# Patient Record
Sex: FEMALE | Race: WHITE | NOT HISPANIC OR LATINO
[De-identification: names, ages, dates, MRNs, and addresses within clinical notes are randomized per-mention and may not be internally consistent; named-entity substitution may affect disease eponyms.]

---

## 2018-01-25 PROBLEM — Z00.00 ENCOUNTER FOR PREVENTIVE HEALTH EXAMINATION: Status: ACTIVE | Noted: 2018-01-25

## 2018-01-26 ENCOUNTER — APPOINTMENT (OUTPATIENT)
Dept: HEART AND VASCULAR | Facility: CLINIC | Age: 68
End: 2018-01-26
Payer: MEDICARE

## 2018-01-26 VITALS
BODY MASS INDEX: 28.71 KG/M2 | TEMPERATURE: 98.7 F | SYSTOLIC BLOOD PRESSURE: 126 MMHG | HEART RATE: 70 BPM | HEIGHT: 62 IN | OXYGEN SATURATION: 98 % | WEIGHT: 156.01 LBS | DIASTOLIC BLOOD PRESSURE: 80 MMHG | RESPIRATION RATE: 16 BRPM

## 2018-01-26 DIAGNOSIS — I35.0 NONRHEUMATIC AORTIC (VALVE) STENOSIS: ICD-10-CM

## 2018-01-26 DIAGNOSIS — Z01.810 ENCOUNTER FOR PREPROCEDURAL CARDIOVASCULAR EXAMINATION: ICD-10-CM

## 2018-01-26 DIAGNOSIS — Z78.9 OTHER SPECIFIED HEALTH STATUS: ICD-10-CM

## 2018-01-26 DIAGNOSIS — Z87.891 PERSONAL HISTORY OF NICOTINE DEPENDENCE: ICD-10-CM

## 2018-01-26 DIAGNOSIS — Z80.1 FAMILY HISTORY OF MALIGNANT NEOPLASM OF TRACHEA, BRONCHUS AND LUNG: ICD-10-CM

## 2018-01-26 DIAGNOSIS — R94.31 ABNORMAL ELECTROCARDIOGRAM [ECG] [EKG]: ICD-10-CM

## 2018-01-26 DIAGNOSIS — I34.0 NONRHEUMATIC MITRAL (VALVE) INSUFFICIENCY: ICD-10-CM

## 2018-01-26 DIAGNOSIS — Z82.49 FAMILY HISTORY OF ISCHEMIC HEART DISEASE AND OTHER DISEASES OF THE CIRCULATORY SYSTEM: ICD-10-CM

## 2018-01-26 DIAGNOSIS — K63.5 POLYP OF COLON: ICD-10-CM

## 2018-01-26 PROCEDURE — 93351 STRESS TTE COMPLETE: CPT

## 2018-01-26 PROCEDURE — 93325 DOPPLER ECHO COLOR FLOW MAPG: CPT

## 2018-01-26 PROCEDURE — 99214 OFFICE O/P EST MOD 30 MIN: CPT | Mod: 25

## 2018-01-26 PROCEDURE — 93320 DOPPLER ECHO COMPLETE: CPT

## 2018-01-31 ENCOUNTER — RESULT REVIEW (OUTPATIENT)
Age: 68
End: 2018-01-31

## 2018-01-31 ENCOUNTER — OUTPATIENT (OUTPATIENT)
Dept: OUTPATIENT SERVICES | Facility: HOSPITAL | Age: 68
LOS: 1 days | Discharge: HOME | End: 2018-01-31
Payer: MEDICARE

## 2018-01-31 PROCEDURE — 88305 TISSUE EXAM BY PATHOLOGIST: CPT

## 2018-01-31 PROCEDURE — 45385 COLONOSCOPY W/LESION REMOVAL: CPT

## 2018-02-01 LAB — SURGICAL PATHOLOGY STUDY: SIGNIFICANT CHANGE UP

## 2018-05-24 ENCOUNTER — APPOINTMENT (OUTPATIENT)
Dept: NEUROSURGERY | Facility: CLINIC | Age: 68
End: 2018-05-24
Payer: MEDICARE

## 2018-05-24 VITALS
TEMPERATURE: 98.2 F | WEIGHT: 158 LBS | OXYGEN SATURATION: 97 % | DIASTOLIC BLOOD PRESSURE: 84 MMHG | HEART RATE: 79 BPM | HEIGHT: 62 IN | SYSTOLIC BLOOD PRESSURE: 150 MMHG | RESPIRATION RATE: 18 BRPM | BODY MASS INDEX: 29.08 KG/M2

## 2018-05-24 DIAGNOSIS — Z86.69 PERSONAL HISTORY OF OTHER DISEASES OF THE NERVOUS SYSTEM AND SENSE ORGANS: ICD-10-CM

## 2018-05-24 DIAGNOSIS — G89.29 LOW BACK PAIN: ICD-10-CM

## 2018-05-24 DIAGNOSIS — Z87.39 PERSONAL HISTORY OF OTHER DISEASES OF THE MUSCULOSKELETAL SYSTEM AND CONNECTIVE TISSUE: ICD-10-CM

## 2018-05-24 DIAGNOSIS — G54.8 OTHER NERVE ROOT AND PLEXUS DISORDERS: ICD-10-CM

## 2018-05-24 DIAGNOSIS — M54.5 LOW BACK PAIN: ICD-10-CM

## 2018-05-24 PROCEDURE — 99204 OFFICE O/P NEW MOD 45 MIN: CPT

## 2018-05-29 PROBLEM — Z86.69 HISTORY OF SLEEP APNEA: Status: RESOLVED | Noted: 2018-05-24 | Resolved: 2018-05-29

## 2018-05-29 PROBLEM — Z87.39 HISTORY OF ARTHRITIS: Status: RESOLVED | Noted: 2018-05-24 | Resolved: 2018-05-29

## 2018-07-23 PROBLEM — G54.8 PERINEURAL CYST: Status: ACTIVE | Noted: 2018-05-29

## 2018-08-21 ENCOUNTER — APPOINTMENT (OUTPATIENT)
Dept: ORTHOPEDIC SURGERY | Facility: CLINIC | Age: 68
End: 2018-08-21
Payer: COMMERCIAL

## 2018-08-21 VITALS — BODY MASS INDEX: 27.64 KG/M2 | WEIGHT: 156 LBS | HEIGHT: 63 IN

## 2018-08-21 PROCEDURE — 99204 OFFICE O/P NEW MOD 45 MIN: CPT

## 2018-08-21 RX ORDER — PREDNISONE 10 MG/1
10 TABLET ORAL
Qty: 30 | Refills: 0 | Status: DISCONTINUED | COMMUNITY
Start: 2018-04-10 | End: 2018-08-21

## 2018-08-21 RX ORDER — AMOXICILLIN 500 MG/1
500 CAPSULE ORAL
Qty: 20 | Refills: 0 | Status: DISCONTINUED | COMMUNITY
Start: 2018-05-29 | End: 2018-08-21

## 2018-08-21 RX ORDER — AMOXICILLIN AND CLAVULANATE POTASSIUM 875; 125 MG/1; MG/1
875-125 TABLET, COATED ORAL
Qty: 14 | Refills: 0 | Status: DISCONTINUED | COMMUNITY
Start: 2018-04-04 | End: 2018-08-21

## 2018-08-21 RX ORDER — OXYCODONE AND ACETAMINOPHEN 5; 325 MG/1; MG/1
5-325 TABLET ORAL
Qty: 8 | Refills: 0 | Status: DISCONTINUED | COMMUNITY
Start: 2018-05-29 | End: 2018-08-21

## 2018-10-09 ENCOUNTER — APPOINTMENT (OUTPATIENT)
Dept: ORTHOPEDIC SURGERY | Facility: CLINIC | Age: 68
End: 2018-10-09

## 2019-01-07 PROBLEM — Z00.00 ENCOUNTER FOR PREVENTIVE HEALTH EXAMINATION: Noted: 2019-01-07

## 2019-01-18 ENCOUNTER — APPOINTMENT (OUTPATIENT)
Dept: ORTHOPEDIC SURGERY | Facility: CLINIC | Age: 69
End: 2019-01-18
Payer: COMMERCIAL

## 2019-01-18 VITALS — HEIGHT: 63 IN | BODY MASS INDEX: 27.64 KG/M2 | WEIGHT: 156 LBS

## 2019-01-18 PROCEDURE — 99213 OFFICE O/P EST LOW 20 MIN: CPT
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
## 2019-01-20 NOTE — HISTORY OF PRESENT ILLNESS
[de-identified] : Ms. Mackey returns today stating that she continues to have right shoulder pain it compromises sleep and activities of daily living. She has performed her home exercises and has increased range of motion of the right shoulder but she continues to have disabling pain.

## 2019-01-20 NOTE — PHYSICAL EXAM
[de-identified] : The right shoulder has 165° passive forward elevation, 60° external rotation, and internal rotation to the 10th thoracic vertebra. She has good strength of external rotation and can fully actively raise the arm but this is painful.

## 2019-01-20 NOTE — CONSULT LETTER
[Dear  ___] : Dear  [unfilled], [FreeTextEntry1] : Today I had the pleasure of evaluating your very nice patient PHAM NARANJO  who requested that I share my findings with you. I very much appreciate the referral. \par \par Please review my office note below and, needless to say, please call or email me with any questions or concerns.\par \par I appreciate the opportunity to participate in her care.\par \par Sincerely,\par \par Maximilian Michael MD\par Director, Orthopaedic Surgery\par and Orthopaedic Strategic Initiatives \par [facility]\par Office: [phone]\par Cell: [phone]\par Email: [email or web address] Website: [employer] \par \par \par \par \par

## 2019-04-16 DIAGNOSIS — M75.41 IMPINGEMENT SYNDROME OF RIGHT SHOULDER: ICD-10-CM

## 2019-04-16 DIAGNOSIS — M75.01 ADHESIVE CAPSULITIS OF RIGHT SHOULDER: ICD-10-CM

## 2019-08-26 ENCOUNTER — APPOINTMENT (OUTPATIENT)
Dept: HEART AND VASCULAR | Facility: CLINIC | Age: 69
End: 2019-08-26
Payer: MEDICARE

## 2019-08-26 VITALS
OXYGEN SATURATION: 97 % | TEMPERATURE: 98.4 F | SYSTOLIC BLOOD PRESSURE: 98 MMHG | WEIGHT: 161 LBS | HEIGHT: 62.99 IN | BODY MASS INDEX: 28.53 KG/M2 | DIASTOLIC BLOOD PRESSURE: 50 MMHG | HEART RATE: 90 BPM

## 2019-08-26 DIAGNOSIS — R06.02 SHORTNESS OF BREATH: ICD-10-CM

## 2019-08-26 DIAGNOSIS — R01.1 CARDIAC MURMUR, UNSPECIFIED: ICD-10-CM

## 2019-08-26 PROCEDURE — 93000 ELECTROCARDIOGRAM COMPLETE: CPT

## 2019-08-26 PROCEDURE — 99204 OFFICE O/P NEW MOD 45 MIN: CPT

## 2019-08-26 RX ORDER — OMEPRAZOLE 20 MG/1
20 CAPSULE, DELAYED RELEASE ORAL DAILY
Refills: 0 | Status: ACTIVE | COMMUNITY

## 2019-08-26 NOTE — PHYSICAL EXAM
[Normal Appearance] : normal appearance [General Appearance - Well Developed] : well developed [Well Groomed] : well groomed [General Appearance - Well Nourished] : well nourished [No Deformities] : no deformities [Normal Conjunctiva] : the conjunctiva exhibited no abnormalities [General Appearance - In No Acute Distress] : no acute distress [Eyelids - No Xanthelasma] : the eyelids demonstrated no xanthelasmas [Normal Oral Mucosa] : normal oral mucosa [No Oral Pallor] : no oral pallor [No Oral Cyanosis] : no oral cyanosis [Normal Jugular Venous A Waves Present] : normal jugular venous A waves present [Normal Jugular Venous V Waves Present] : normal jugular venous V waves present [No Jugular Venous Kim A Waves] : no jugular venous kim A waves [Heart Rate And Rhythm] : heart rate and rhythm were normal [Heart Sounds] : normal S1 and S2 [FreeTextEntry1] : 3/6 ha to axilla [Exaggerated Use Of Accessory Muscles For Inspiration] : no accessory muscle use [Respiration, Rhythm And Depth] : normal respiratory rhythm and effort [Auscultation Breath Sounds / Voice Sounds] : lungs were clear to auscultation bilaterally [Abdomen Soft] : soft [Abdomen Tenderness] : non-tender [Abdomen Mass (___ Cm)] : no abdominal mass palpated [Abnormal Walk] : normal gait [Gait - Sufficient For Exercise Testing] : the gait was sufficient for exercise testing [Cyanosis, Localized] : no localized cyanosis [Nail Clubbing] : no clubbing of the fingernails [Petechial Hemorrhages (___cm)] : no petechial hemorrhages [Skin Color & Pigmentation] : normal skin color and pigmentation [No Venous Stasis] : no venous stasis [] : no rash [Skin Lesions] : no skin lesions [No Xanthoma] : no  xanthoma was observed [No Skin Ulcers] : no skin ulcer [Affect] : the affect was normal [Oriented To Time, Place, And Person] : oriented to person, place, and time [No Anxiety] : not feeling anxious [Mood] : the mood was normal

## 2019-08-26 NOTE — HISTORY OF PRESENT ILLNESS
[FreeTextEntry1] : \par 68 F HTN on ARB here for new onset sob jacques, states she has alwaywas had an abnormal ekg and a murmur for which she was told was normal\par 100% compliant with meds\par location: chest\par duration: exertional\par  modifying factors: rest\par timing: seconds\par severity: 8/10\par EKG NSR LAD RSR'\par \par FHx NC\par \par Sochx non smoker\par

## 2019-08-26 NOTE — DISCUSSION/SUMMARY
[FreeTextEntry1] : The number of diagnostic and/or management options include:\par CAD, HTN, HPL, CV Prevention\par \par CAD - need to r/o cad etiology of sob jacques - holger get 2d echo and stress echo\par \par HTN - started on candesartan will look ad dd on echo and bp response to exercise\par \par murmur - suspect M P will eavl MR with exertion\par \par \par Labs, radiology: ekg\par \par Aspirin therapy: no\par \par LDL: n/a\par \par High Complexity Medical Decision Making\par

## 2019-09-11 ENCOUNTER — APPOINTMENT (OUTPATIENT)
Dept: HEART AND VASCULAR | Facility: CLINIC | Age: 69
End: 2019-09-11
Payer: MEDICARE

## 2019-09-11 PROCEDURE — 93351 STRESS TTE COMPLETE: CPT

## 2019-09-11 PROCEDURE — 93320 DOPPLER ECHO COMPLETE: CPT

## 2019-09-11 PROCEDURE — 93325 DOPPLER ECHO COLOR FLOW MAPG: CPT | Mod: 59

## 2019-09-13 ENCOUNTER — APPOINTMENT (OUTPATIENT)
Dept: HEART AND VASCULAR | Facility: CLINIC | Age: 69
End: 2019-09-13

## 2019-10-16 ENCOUNTER — APPOINTMENT (OUTPATIENT)
Dept: HEART AND VASCULAR | Facility: CLINIC | Age: 69
End: 2019-10-16
Payer: MEDICARE

## 2019-10-16 VITALS
BODY MASS INDEX: 29.64 KG/M2 | TEMPERATURE: 98.3 F | WEIGHT: 156.99 LBS | HEART RATE: 81 BPM | HEIGHT: 61.02 IN | DIASTOLIC BLOOD PRESSURE: 66 MMHG | OXYGEN SATURATION: 98 % | SYSTOLIC BLOOD PRESSURE: 108 MMHG

## 2019-10-16 PROCEDURE — 93000 ELECTROCARDIOGRAM COMPLETE: CPT

## 2019-10-16 PROCEDURE — 99214 OFFICE O/P EST MOD 30 MIN: CPT

## 2019-10-16 RX ORDER — LOSARTAN POTASSIUM 50 MG/1
50 TABLET, FILM COATED ORAL
Qty: 90 | Refills: 3 | Status: ACTIVE | COMMUNITY
Start: 2019-10-16 | End: 1900-01-01

## 2019-10-18 NOTE — DISCUSSION/SUMMARY
[FreeTextEntry1] : The number of diagnostic and/or management options include:\par CAD, HTN, HPL, CV Prevention\par \par CAD - need to r/o cad etiology of sob jacques -normal stress echo\par HTN - changed to losartan 50mg  \par \par murmur -trace mr \par \par \par Labs, radiology: ekg\par \par Aspirin therapy: no\par \par LDL: n/a\par \par High Complexity Medical Decision Making\par

## 2019-10-18 NOTE — PHYSICAL EXAM
[Normal Appearance] : normal appearance [General Appearance - Well Developed] : well developed [Well Groomed] : well groomed [No Deformities] : no deformities [General Appearance - Well Nourished] : well nourished [General Appearance - In No Acute Distress] : no acute distress [Normal Conjunctiva] : the conjunctiva exhibited no abnormalities [Normal Oral Mucosa] : normal oral mucosa [Eyelids - No Xanthelasma] : the eyelids demonstrated no xanthelasmas [No Oral Pallor] : no oral pallor [Normal Jugular Venous A Waves Present] : normal jugular venous A waves present [No Oral Cyanosis] : no oral cyanosis [Normal Jugular Venous V Waves Present] : normal jugular venous V waves present [No Jugular Venous Kim A Waves] : no jugular venous kim A waves [Respiration, Rhythm And Depth] : normal respiratory rhythm and effort [Auscultation Breath Sounds / Voice Sounds] : lungs were clear to auscultation bilaterally [Exaggerated Use Of Accessory Muscles For Inspiration] : no accessory muscle use [Heart Sounds] : normal S1 and S2 [FreeTextEntry1] : 3/6 ha to axilla [Heart Rate And Rhythm] : heart rate and rhythm were normal [Abdomen Soft] : soft [Abdomen Tenderness] : non-tender [Abdomen Mass (___ Cm)] : no abdominal mass palpated [Nail Clubbing] : no clubbing of the fingernails [Abnormal Walk] : normal gait [Gait - Sufficient For Exercise Testing] : the gait was sufficient for exercise testing [Cyanosis, Localized] : no localized cyanosis [Skin Color & Pigmentation] : normal skin color and pigmentation [Petechial Hemorrhages (___cm)] : no petechial hemorrhages [No Venous Stasis] : no venous stasis [Skin Lesions] : no skin lesions [] : no rash [No Skin Ulcers] : no skin ulcer [No Xanthoma] : no  xanthoma was observed [Oriented To Time, Place, And Person] : oriented to person, place, and time [Mood] : the mood was normal [No Anxiety] : not feeling anxious [Affect] : the affect was normal

## 2019-10-18 NOTE — REVIEW OF SYSTEMS
[Shortness Of Breath] : no shortness of breath [Dyspnea on exertion] : not dyspnea during exertion [Negative] : Heme/Lymph

## 2020-01-15 ENCOUNTER — RX RENEWAL (OUTPATIENT)
Age: 70
End: 2020-01-15

## 2020-01-15 RX ORDER — CANDESARTAN CILEXETIL 16 MG/1
16 TABLET ORAL
Qty: 90 | Refills: 3 | Status: ACTIVE | COMMUNITY
Start: 2020-01-15 | End: 1900-01-01

## 2020-02-10 ENCOUNTER — APPOINTMENT (OUTPATIENT)
Dept: ORTHOPEDIC SURGERY | Facility: CLINIC | Age: 70
End: 2020-02-10
Payer: MEDICARE

## 2020-02-10 VITALS — RESPIRATION RATE: 16 BRPM | BODY MASS INDEX: 29.45 KG/M2 | WEIGHT: 156 LBS | HEIGHT: 61 IN

## 2020-02-10 DIAGNOSIS — M18.0 BILATERAL PRIMARY OSTEOARTHRITIS OF FIRST CARPOMETACARPAL JOINTS: ICD-10-CM

## 2020-02-10 PROCEDURE — 20600 DRAIN/INJ JOINT/BURSA W/O US: CPT | Mod: LT

## 2020-02-10 PROCEDURE — 99214 OFFICE O/P EST MOD 30 MIN: CPT | Mod: 25

## 2020-02-20 ENCOUNTER — RX RENEWAL (OUTPATIENT)
Age: 70
End: 2020-02-20

## 2020-02-24 ENCOUNTER — RX CHANGE (OUTPATIENT)
Age: 70
End: 2020-02-24

## 2020-02-24 ENCOUNTER — RX RENEWAL (OUTPATIENT)
Age: 70
End: 2020-02-24

## 2020-02-24 RX ORDER — CANDESARTAN CILEXETIL 32 MG/1
32 TABLET ORAL DAILY
Qty: 90 | Refills: 2 | Status: ACTIVE | COMMUNITY
Start: 2020-02-24 | End: 1900-01-01

## 2020-04-16 ENCOUNTER — APPOINTMENT (OUTPATIENT)
Dept: HEART AND VASCULAR | Facility: CLINIC | Age: 70
End: 2020-04-16

## 2021-03-16 ENCOUNTER — INPATIENT (INPATIENT)
Facility: HOSPITAL | Age: 71
LOS: 2 days | Discharge: HOME | DRG: 871 | End: 2021-03-19
Attending: FAMILY MEDICINE | Admitting: STUDENT IN AN ORGANIZED HEALTH CARE EDUCATION/TRAINING PROGRAM
Payer: MEDICARE

## 2021-03-16 VITALS
RESPIRATION RATE: 20 BRPM | HEIGHT: 63 IN | DIASTOLIC BLOOD PRESSURE: 79 MMHG | OXYGEN SATURATION: 92 % | HEART RATE: 93 BPM | SYSTOLIC BLOOD PRESSURE: 129 MMHG | WEIGHT: 160.06 LBS | TEMPERATURE: 102 F

## 2021-03-16 DIAGNOSIS — U07.1 COVID-19: ICD-10-CM

## 2021-03-16 DIAGNOSIS — G47.33 OBSTRUCTIVE SLEEP APNEA (ADULT) (PEDIATRIC): ICD-10-CM

## 2021-03-16 DIAGNOSIS — R63.8 OTHER SYMPTOMS AND SIGNS CONCERNING FOOD AND FLUID INTAKE: ICD-10-CM

## 2021-03-16 DIAGNOSIS — J96.01 ACUTE RESPIRATORY FAILURE WITH HYPOXIA: ICD-10-CM

## 2021-03-16 DIAGNOSIS — A41.9 SEPSIS, UNSPECIFIED ORGANISM: ICD-10-CM

## 2021-03-16 DIAGNOSIS — I10 ESSENTIAL (PRIMARY) HYPERTENSION: ICD-10-CM

## 2021-03-16 LAB
ALBUMIN SERPL ELPH-MCNC: 3.7 G/DL — SIGNIFICANT CHANGE UP (ref 3.3–5)
ALP SERPL-CCNC: 91 U/L — SIGNIFICANT CHANGE UP (ref 40–120)
ALT FLD-CCNC: 73 U/L — HIGH (ref 10–45)
ANION GAP SERPL CALC-SCNC: 11 MMOL/L — SIGNIFICANT CHANGE UP (ref 5–17)
APTT BLD: 34 SEC — SIGNIFICANT CHANGE UP (ref 27.5–35.5)
AST SERPL-CCNC: 100 U/L — HIGH (ref 10–40)
BASE EXCESS BLDV CALC-SCNC: -0.7 MMOL/L — SIGNIFICANT CHANGE UP
BASOPHILS # BLD AUTO: 0.01 K/UL — SIGNIFICANT CHANGE UP (ref 0–0.2)
BASOPHILS NFR BLD AUTO: 0.2 % — SIGNIFICANT CHANGE UP (ref 0–2)
BILIRUB SERPL-MCNC: 0.4 MG/DL — SIGNIFICANT CHANGE UP (ref 0.2–1.2)
BUN SERPL-MCNC: 8 MG/DL — SIGNIFICANT CHANGE UP (ref 7–23)
CA-I SERPL-SCNC: 1.1 MMOL/L — LOW (ref 1.12–1.3)
CALCIUM SERPL-MCNC: 8.3 MG/DL — LOW (ref 8.4–10.5)
CHLORIDE SERPL-SCNC: 105 MMOL/L — SIGNIFICANT CHANGE UP (ref 96–108)
CK SERPL-CCNC: 119 U/L — SIGNIFICANT CHANGE UP (ref 25–170)
CO2 SERPL-SCNC: 22 MMOL/L — SIGNIFICANT CHANGE UP (ref 22–31)
CREAT SERPL-MCNC: 0.62 MG/DL — SIGNIFICANT CHANGE UP (ref 0.5–1.3)
CRP SERPL-MCNC: 8.7 MG/L — HIGH (ref 0–4)
D DIMER BLD IA.RAPID-MCNC: 235 NG/ML DDU — HIGH
EOSINOPHIL # BLD AUTO: 0 K/UL — SIGNIFICANT CHANGE UP (ref 0–0.5)
EOSINOPHIL NFR BLD AUTO: 0 % — SIGNIFICANT CHANGE UP (ref 0–6)
FERRITIN SERPL-MCNC: 1242 NG/ML — HIGH (ref 15–150)
GAS PNL BLDV: 138 MMOL/L — SIGNIFICANT CHANGE UP (ref 138–146)
GAS PNL BLDV: SIGNIFICANT CHANGE UP
GAS PNL BLDV: SIGNIFICANT CHANGE UP
GLUCOSE SERPL-MCNC: 125 MG/DL — HIGH (ref 70–99)
HCO3 BLDV-SCNC: 24 MMOL/L — SIGNIFICANT CHANGE UP (ref 20–27)
HCT VFR BLD CALC: 37.3 % — SIGNIFICANT CHANGE UP (ref 34.5–45)
HGB BLD-MCNC: 12.7 G/DL — SIGNIFICANT CHANGE UP (ref 11.5–15.5)
IMM GRANULOCYTES NFR BLD AUTO: 0.4 % — SIGNIFICANT CHANGE UP (ref 0–1.5)
INR BLD: 1.21 — HIGH (ref 0.88–1.16)
LACTATE SERPL-SCNC: 1.3 MMOL/L — SIGNIFICANT CHANGE UP (ref 0.5–2)
LYMPHOCYTES # BLD AUTO: 0.84 K/UL — LOW (ref 1–3.3)
LYMPHOCYTES # BLD AUTO: 17.2 % — SIGNIFICANT CHANGE UP (ref 13–44)
MCHC RBC-ENTMCNC: 33.8 PG — SIGNIFICANT CHANGE UP (ref 27–34)
MCHC RBC-ENTMCNC: 34 GM/DL — SIGNIFICANT CHANGE UP (ref 32–36)
MCV RBC AUTO: 99.2 FL — SIGNIFICANT CHANGE UP (ref 80–100)
MONOCYTES # BLD AUTO: 0.49 K/UL — SIGNIFICANT CHANGE UP (ref 0–0.9)
MONOCYTES NFR BLD AUTO: 10.1 % — SIGNIFICANT CHANGE UP (ref 2–14)
NEUTROPHILS # BLD AUTO: 3.51 K/UL — SIGNIFICANT CHANGE UP (ref 1.8–7.4)
NEUTROPHILS NFR BLD AUTO: 72.1 % — SIGNIFICANT CHANGE UP (ref 43–77)
NRBC # BLD: 0 /100 WBCS — SIGNIFICANT CHANGE UP (ref 0–0)
NT-PROBNP SERPL-SCNC: 493 PG/ML — HIGH (ref 0–300)
PCO2 BLDV: 38 MMHG — LOW (ref 41–51)
PH BLDV: 7.41 — SIGNIFICANT CHANGE UP (ref 7.32–7.43)
PLATELET # BLD AUTO: 143 K/UL — LOW (ref 150–400)
PO2 BLDV: 32 MMHG — SIGNIFICANT CHANGE UP
POTASSIUM BLDV-SCNC: 3.4 MMOL/L — LOW (ref 3.5–4.9)
POTASSIUM SERPL-MCNC: 3.4 MMOL/L — LOW (ref 3.5–5.3)
POTASSIUM SERPL-SCNC: 3.4 MMOL/L — LOW (ref 3.5–5.3)
PROCALCITONIN SERPL-MCNC: 0.25 NG/ML — HIGH (ref 0.02–0.1)
PROT SERPL-MCNC: 6.9 G/DL — SIGNIFICANT CHANGE UP (ref 6–8.3)
PROTHROM AB SERPL-ACNC: 14.4 SEC — HIGH (ref 10.6–13.6)
RAPID RVP RESULT: DETECTED
RBC # BLD: 3.76 M/UL — LOW (ref 3.8–5.2)
RBC # FLD: 12 % — SIGNIFICANT CHANGE UP (ref 10.3–14.5)
SAO2 % BLDV: 62 % — SIGNIFICANT CHANGE UP
SARS-COV-2 RNA SPEC QL NAA+PROBE: DETECTED
SARS-COV-2 RNA SPEC QL NAA+PROBE: DETECTED
SODIUM SERPL-SCNC: 138 MMOL/L — SIGNIFICANT CHANGE UP (ref 135–145)
TROPONIN T SERPL-MCNC: 0.01 NG/ML — SIGNIFICANT CHANGE UP (ref 0–0.01)
WBC # BLD: 4.87 K/UL — SIGNIFICANT CHANGE UP (ref 3.8–10.5)
WBC # FLD AUTO: 4.87 K/UL — SIGNIFICANT CHANGE UP (ref 3.8–10.5)

## 2021-03-16 PROCEDURE — 71045 X-RAY EXAM CHEST 1 VIEW: CPT | Mod: 26

## 2021-03-16 PROCEDURE — 99285 EMERGENCY DEPT VISIT HI MDM: CPT | Mod: CS

## 2021-03-16 PROCEDURE — 93010 ELECTROCARDIOGRAM REPORT: CPT

## 2021-03-16 PROCEDURE — 99223 1ST HOSP IP/OBS HIGH 75: CPT | Mod: CS,GC

## 2021-03-16 RX ORDER — REMDESIVIR 5 MG/ML
INJECTION INTRAVENOUS
Refills: 0 | Status: DISCONTINUED | OUTPATIENT
Start: 2021-03-16 | End: 2021-03-16

## 2021-03-16 RX ORDER — DEXAMETHASONE 0.5 MG/5ML
6 ELIXIR ORAL ONCE
Refills: 0 | Status: COMPLETED | OUTPATIENT
Start: 2021-03-16 | End: 2021-03-16

## 2021-03-16 RX ORDER — REMDESIVIR 5 MG/ML
200 INJECTION INTRAVENOUS EVERY 24 HOURS
Refills: 0 | Status: COMPLETED | OUTPATIENT
Start: 2021-03-16 | End: 2021-03-17

## 2021-03-16 RX ORDER — ENOXAPARIN SODIUM 100 MG/ML
40 INJECTION SUBCUTANEOUS EVERY 24 HOURS
Refills: 0 | Status: DISCONTINUED | OUTPATIENT
Start: 2021-03-16 | End: 2021-03-19

## 2021-03-16 RX ORDER — DEXAMETHASONE 0.5 MG/5ML
6 ELIXIR ORAL EVERY 24 HOURS
Refills: 0 | Status: DISCONTINUED | OUTPATIENT
Start: 2021-03-17 | End: 2021-03-19

## 2021-03-16 RX ORDER — POTASSIUM CHLORIDE 20 MEQ
20 PACKET (EA) ORAL
Refills: 0 | Status: COMPLETED | OUTPATIENT
Start: 2021-03-16 | End: 2021-03-17

## 2021-03-16 RX ORDER — ISOSORBIDE DINITRATE 5 MG/1
0 TABLET ORAL
Qty: 0 | Refills: 0 | DISCHARGE

## 2021-03-16 RX ORDER — REMDESIVIR 5 MG/ML
INJECTION INTRAVENOUS
Refills: 0 | Status: DISCONTINUED | OUTPATIENT
Start: 2021-03-16 | End: 2021-03-17

## 2021-03-16 RX ADMIN — Medication 20 MILLIEQUIVALENT(S): at 21:50

## 2021-03-16 RX ADMIN — Medication 6 MILLIGRAM(S): at 21:50

## 2021-03-16 RX ADMIN — ENOXAPARIN SODIUM 40 MILLIGRAM(S): 100 INJECTION SUBCUTANEOUS at 22:14

## 2021-03-16 RX ADMIN — Medication 20 MILLIEQUIVALENT(S): at 22:14

## 2021-03-16 NOTE — H&P ADULT - PROBLEM SELECTOR PLAN 4
Patient inititally scheduled for cardiac cath now deferred in setting of positive covid-19 pcr.   -Echocardiogram (01/29/2021) showed moderate concentric LV hypertrophy w/ normal global wall motion and EF 65%, grade I diastolic dysfunction, RV normal size and systolic function, moderate AR, mild AS, mild MR w/ moderate calcification of mitral valve annulus, mild TR, trace AZ, and no evidence of pericardial effusion.  -NST (02/15/2021) showed mild anteroapical and anteroseptal ischemia w/ normal biventricular size and function.  - on Toprol 25 mg daily and isosorbide dinitrite 30 mg daily.   - BP well controlled; will hold for now and can restart as HR and BP rise  - A1c and lipid panel to calculate ascvd, start statin as indicated Patient inititally scheduled for cardiac cath now deferred in setting of positive covid-19 pcr.   -Echocardiogram (01/29/2021) showed moderate concentric LV hypertrophy w/ normal global wall motion and EF 65%, grade I diastolic dysfunction, RV normal size and systolic function, moderate AR, mild AS, mild MR w/ moderate calcification of mitral valve annulus, mild TR, trace TN, and no evidence of pericardial effusion.  -NST (02/15/2021) showed mild anteroapical and anteroseptal ischemia w/ normal biventricular size and function.  - on Toprol 25 mg daily and isosorbide dinitrite 30 mg daily.   - BP well controlled; will hold for now and can restart as HR and BP rise  - A1c and lipid panel to calculate ascvd, start statin as indicated  - c/w home aspirin Patient inititally scheduled for cardiac cath now deferred in setting of positive covid-19 pcr.   -Echocardiogram (01/29/2021) showed moderate concentric LV hypertrophy w/ normal global wall motion and EF 65%, grade I diastolic dysfunction, RV normal size and systolic function, moderate AR, mild AS, mild MR w/ moderate calcification of mitral valve annulus, mild TR, trace FL, and no evidence of pericardial effusion.  -NST (02/15/2021) showed mild anteroapical and anteroseptal ischemia w/ normal biventricular size and function.  - on Toprol 25 mg daily and isosorbide dinitrite 30 mg daily.   - BP well controlled; will hold for now and can restart as HR and BP rise  - A1c and lipid panel to calculate ascvd, start statin as indicated  - c/w home aspirin    ADDENDUM: consult cardiology while inpatient for monitoring / management of cardiac disease while w/ covid.

## 2021-03-16 NOTE — ED ADULT TRIAGE NOTE - CHIEF COMPLAINT QUOTE
Pt dx w/ covid on 3/12/2021 presents to the ED c/o SOB associated w/ fever and cough. Denies chills, CP, palpitations, NVD.

## 2021-03-16 NOTE — H&P ADULT - ASSESSMENT
69 y/o female, former smoker, w/ PMHx HTN and VINCE (does not use CPAP) presents today after having 5 days of SOB associated with cough with sputum production. Now admitted for acute hypoxic respiratory failure 2/2 to COVID-19.

## 2021-03-16 NOTE — H&P ADULT - PROBLEM SELECTOR PROBLEM 3
Sepsis, due to unspecified organism, unspecified whether acute organ dysfunction present Pneumonia due to COVID-19 virus

## 2021-03-16 NOTE — H&P ADULT - HISTORY OF PRESENT ILLNESS
71 y/o female, former smoker, w/ PMHx HTN and VINCE (does not use CPAP) presents today after having 5 days of SOB associated with cough with sputum production. The patient has been having progressive SOB for the last 6 months. Previously, she had no functional limitations, but as of recently, she becomes SOB while walking up 2 flights of stairs or several city blocks. In the recent week, her daughter, with whom she lives, became sick with COVID. She was seeing her cardiologist, Dr. Esposito, who had referred her to undergo an elective cardiac cath on 3/15, but this was rescheduled when she was found to be covid positive on 3/12. Her shortness of breath worsened in the last two days, and for that reason, she decided to come to the ED for evaluation. On ROS, the patient states to have a headache, diarrhea, subejctive fever/chills and SOB improved with nasal cannula at 2 L/min. She denies vision changes, CP, palpitations, n/v/c, dysuria, muscle/joint pain.     In the ED, her vitals were Temp 102 F, HR 93, /79, RR 20, Spo2 92% while on RA now improved to 98% on NC 2L/min. Her labs are s/f for platelts 143, d-dimer 235, K 3,4m calcium 8.3, AST//73, CRP 8.7, ferritin 1242, procal 0.25, and BNP of 493. CXR s/f for bilateral infiltrates with possible consolidation on the left lower lobe. EKG with RBBB. In the ED, the patient received decadron 6 mg x1.

## 2021-03-16 NOTE — H&P ADULT - PROBLEM SELECTOR PLAN 2
Spo2 of 92% while on RA now on 2L NC  - wean oxygen as tolerated   - most likely due to covid-19 infection, management as above

## 2021-03-16 NOTE — H&P ADULT - NSHPPHYSICALEXAM_GEN_ALL_CORE
VITALS:   T(C): 39.3 (03-16-21 @ 17:28), Max: 39.3 (03-16-21 @ 17:28)  HR: 76 (03-16-21 @ 17:28) (76 - 93)  BP: 132/70 (03-16-21 @ 17:28) (129/79 - 132/70)  RR: 18 (03-16-21 @ 17:28) (18 - 20)  SpO2: 98% (03-16-21 @ 17:28) (92% - 98%)    GENERAL: NAD, lying in bed comfortably  HEAD:  Atraumatic, Normocephalic  EYES: EOMI, PERRLA, conjunctiva and sclera clear  ENT: Moist mucous membranes  NECK: Supple, No JVD  CHEST/LUNG: Clear to auscultation bilaterally with reduced sounds at the bases   HEART: Regular rate and rhythm; No murmurs, rubs, or gallops  ABDOMEN: BSx4; Soft, nontender, nondistended  EXTREMITIES:  2+ Peripheral Pulses, brisk capillary refill. No clubbing, cyanosis, or edema  NERVOUS SYSTEM:  A&Ox3, no focal deficits   SKIN: No rashes or lesions

## 2021-03-16 NOTE — H&P ADULT - PROBLEM SELECTOR PLAN 1
Presents with 5 days of acute on chronic SOB. Lives with daughter who is now covid positive. Fever of 102, HR 93, and spo2 of 92% while on RA. Cough produtive with white sputum. Elevated inflammatory markers with elevated procal.   - c/w decadron for 10 days (3/16 -   - start remdesevir (3/16 -   - daily labs with CRP, ferritin, and d-dimer   - repeat procal if worsening fever curve (has not received abx yet)   - follow-up bcx   - follow-up sputum culture Meets 2/4 SIRS (HR, temp, and RR) with COVID-19 as the presumed source.   - covid-19 management as above   - no need for abx at this time, but with elevated procal of 0.25. If rising fever curve, start treatment for CAP.   - follow-up Bcx and sputum cx

## 2021-03-16 NOTE — H&P ADULT - NSHPSOCIALHISTORY_GEN_ALL_CORE
Smoked 1 pack per week for 2 eyars in the 1980s, since as quit. Drinks 1 bottle of wine on Saturday and Sundays. Denies recreational drugs. Lives with daughter whom is also Covid positive. Smoked 1 pack per week for 2 years in the 1980s, since as quit. Drinks 1 bottle of wine on Saturday and Sundays. Denies recreational drugs. Lives with daughter whom is also Covid positive.

## 2021-03-16 NOTE — ED ADULT NURSE NOTE - OBJECTIVE STATEMENT
pt states that she a Covid test done on 3/12/21 and it came back positive. Pt reports worsening SOB, weakness, and fever. Pt placed on O2 via NC and states that she feels much better afterwards. Pt states multiple family members have just tested positive for Covid as well

## 2021-03-16 NOTE — ED PROVIDER NOTE - OBJECTIVE STATEMENT
70F former smoker, htn, ángel (does not use cpap), recently dx covid pcr+ (3/12/21) in anticipation of cardiac cath (now deferred given results), c/o 5d progressively worsening initially exertional now resting sob, diffuse chest tightness, cough, subjective fever/chills. +sick contact (both sisters covid pcr+). EF 65% by echo (1/29/21). no ha/dizziness, no neck pain/stiffness, no photophobia/vision changes, no abd pain/n/v, no diarrhea, no change in appetite/po intake, no dysuria, no rash, no prior covid, no trauma, no etoh-dpt/ivdu.    pcp: minna  cards: herbert

## 2021-03-16 NOTE — H&P ADULT - PROBLEM SELECTOR PROBLEM 1
SUBJECTIVE:  Tomi Pneumonia due to COVID-19 virus Sepsis, due to unspecified organism, unspecified whether acute organ dysfunction present

## 2021-03-16 NOTE — ED PROVIDER NOTE - PHYSICAL EXAMINATION
CONST: overweight nontoxic NAD speaking in full sentences  HEAD: atraumatic  EYES: conjunctivae clear, PERRL, EOMI  ENT: +2L nc, mmm  NECK: supple/FROM, nttp, no jvd  CARD: rrr no murmurs  CHEST: bb rales  ABD: soft, nd, nttp, no rebound/guarding  EXT: FROM, symmetric distal pulses intact  SKIN: warm, dry, no rash, no pedal edema/ttp/rash, cap refill <2sec  NEURO: a+ox3, 5/5 strength x4, gross sensation intact x4, normal gait

## 2021-03-16 NOTE — H&P ADULT - PROBLEM SELECTOR PLAN 5
- on Toprol 25 mg daily and isosorbide dinitrite 30 mg daily.   - BP well controlled; will hold for now and can restart as HR and BP rise

## 2021-03-16 NOTE — ED PROVIDER NOTE - CLINICAL SUMMARY MEDICAL DECISION MAKING FREE TEXT BOX
febrile. hds. resting hypoxia, 91-92% on RA improved on 2L nc. found to have elevated covid markers in setting of recently dx covid pcr. also found to have patchy bl opacities on cxr most c/w covid pna. s/p decadron. ddimer 200s. trop 0.01. ekg w/o acute st/t changes. dr buitrago contacted. admit to hospitalist. will admit.

## 2021-03-16 NOTE — PROGRESS NOTE ADULT - SUBJECTIVE AND OBJECTIVE BOX
PT. seen and examined at bed side.    Case discussed with medical team.    Case discussed with Consultants.    Orders reviewed.      ICU Vital Signs Last 24 Hrs  T(C): 39.3 (16 Mar 2021 17:28), Max: 39.3 (16 Mar 2021 17:28)  T(F): 102.7 (16 Mar 2021 17:28), Max: 102.7 (16 Mar 2021 17:28)  HR: 76 (16 Mar 2021 17:28) (76 - 93)  BP: 132/70 (16 Mar 2021 17:28) (129/79 - 132/70)  BP(mean): --  ABP: --  ABP(mean): --  RR: 18 (16 Mar 2021 17:28) (18 - 20)  SpO2: 98% (16 Mar 2021 17:28) (92% - 98%)      D-Dimer Assay, Quantitative: 235: The negative cutoff limit for DVT or PE is 230 D-DU ng/mL. This test  should be used as an aid in diagnosis and not be used to exclude deep  vein thrombosis or pulmonary embolism. ng/mL DDU (03.16.21 @ 18:35)    The patient is a 70y Female complaining of shortness of breath     70F former smoker, htn, ángel (does not use cpap), recently dx covid pcr+ (3/12/21) in anticipation of cardiac cath (now deferred given results), c/o 5d progressively worsening initially exertional now resting sob, diffuse chest tightness, cough, subjective fever/chills. +sick contact (both sisters covid pcr+). EF 65% by echo (1/29/21). no ha/dizziness, no neck pain/stiffness, no photophobia/vision changes, no abd pain/n/v, no diarrhea, no change in appetite/po intake, no dysuria, no rash, no prior covid, no trauma, no etoh-dpt/ivdu.

## 2021-03-16 NOTE — H&P ADULT - PROBLEM SELECTOR PLAN 3
Meets 2/4 SIRS (HR, temp, and RR) with COVID-19 as the presumed source.   - covid-19 management as above   - no need for abx at this time, but with elevated procal of 0.25. If rising fever curve, start treatment for CAP.   - follow-up Bcx and sputum cx Presents with 5 days of acute on chronic SOB. Lives with daughter who is now covid positive. Fever of 102, HR 93, and spo2 of 92% while on RA. Cough produtive with white sputum. Elevated inflammatory markers with elevated procal.   - c/w decadron for 10 days (3/16 -   - start remdesevir (3/16 -   - daily labs with CRP, ferritin, and d-dimer   - repeat procal if worsening fever curve (has not received abx yet)   - follow-up bcx   - follow-up sputum culture

## 2021-03-16 NOTE — H&P ADULT - NSHPLABSRESULTS_GEN_ALL_CORE
LABS:                        12.7   4.87  )-----------( 143      ( 16 Mar 2021 18:34 )             37.3     03-16    138  |  105  |  8   ----------------------------<  125<H>  3.4<L>   |  22  |  0.62    Ca    8.3<L>      16 Mar 2021 18:35    TPro  6.9  /  Alb  3.7  /  TBili  0.4  /  DBili  x   /  AST  100<H>  /  ALT  73<H>  /  AlkPhos  91  03-16    PT/INR - ( 16 Mar 2021 18:35 )   PT: 14.4 sec;   INR: 1.21          PTT - ( 16 Mar 2021 18:35 )  PTT:34.0 sec    CAPILLARY BLOOD GLUCOSE            RADIOLOGY & ADDITIONAL TESTS: Reviewed.

## 2021-03-17 ENCOUNTER — TRANSCRIPTION ENCOUNTER (OUTPATIENT)
Age: 71
End: 2021-03-17

## 2021-03-17 DIAGNOSIS — I25.9 CHRONIC ISCHEMIC HEART DISEASE, UNSPECIFIED: ICD-10-CM

## 2021-03-17 PROBLEM — G47.33 OBSTRUCTIVE SLEEP APNEA (ADULT) (PEDIATRIC): Chronic | Status: ACTIVE | Noted: 2021-03-12

## 2021-03-17 PROBLEM — I10 ESSENTIAL (PRIMARY) HYPERTENSION: Chronic | Status: ACTIVE | Noted: 2021-03-12

## 2021-03-17 LAB
A1C WITH ESTIMATED AVERAGE GLUCOSE RESULT: 5.8 % — HIGH (ref 4–5.6)
ALBUMIN SERPL ELPH-MCNC: 3.7 G/DL — SIGNIFICANT CHANGE UP (ref 3.3–5)
ALP SERPL-CCNC: 87 U/L — SIGNIFICANT CHANGE UP (ref 40–120)
ALT FLD-CCNC: 65 U/L — HIGH (ref 10–45)
ANION GAP SERPL CALC-SCNC: 11 MMOL/L — SIGNIFICANT CHANGE UP (ref 5–17)
AST SERPL-CCNC: 82 U/L — HIGH (ref 10–40)
BASOPHILS # BLD AUTO: 0.01 K/UL — SIGNIFICANT CHANGE UP (ref 0–0.2)
BASOPHILS NFR BLD AUTO: 0.3 % — SIGNIFICANT CHANGE UP (ref 0–2)
BILIRUB SERPL-MCNC: 0.4 MG/DL — SIGNIFICANT CHANGE UP (ref 0.2–1.2)
BUN SERPL-MCNC: 10 MG/DL — SIGNIFICANT CHANGE UP (ref 7–23)
CALCIUM SERPL-MCNC: 8.5 MG/DL — SIGNIFICANT CHANGE UP (ref 8.4–10.5)
CHLORIDE SERPL-SCNC: 108 MMOL/L — SIGNIFICANT CHANGE UP (ref 96–108)
CHOLEST SERPL-MCNC: 145 MG/DL — SIGNIFICANT CHANGE UP
CK MB CFR SERPL CALC: 2 NG/ML — SIGNIFICANT CHANGE UP (ref 0–6.7)
CK SERPL-CCNC: 147 U/L — SIGNIFICANT CHANGE UP (ref 25–170)
CO2 SERPL-SCNC: 23 MMOL/L — SIGNIFICANT CHANGE UP (ref 22–31)
CREAT SERPL-MCNC: 0.56 MG/DL — SIGNIFICANT CHANGE UP (ref 0.5–1.3)
CRP SERPL-MCNC: 15.4 MG/L — HIGH (ref 0–4)
D DIMER BLD IA.RAPID-MCNC: 155 NG/ML DDU — SIGNIFICANT CHANGE UP
EOSINOPHIL # BLD AUTO: 0 K/UL — SIGNIFICANT CHANGE UP (ref 0–0.5)
EOSINOPHIL NFR BLD AUTO: 0 % — SIGNIFICANT CHANGE UP (ref 0–6)
ESTIMATED AVERAGE GLUCOSE: 120 MG/DL — HIGH (ref 68–114)
FERRITIN SERPL-MCNC: 1081 NG/ML — HIGH (ref 15–150)
GLUCOSE SERPL-MCNC: 136 MG/DL — HIGH (ref 70–99)
HCT VFR BLD CALC: 38.6 % — SIGNIFICANT CHANGE UP (ref 34.5–45)
HCV AB S/CO SERPL IA: 0.09 S/CO — SIGNIFICANT CHANGE UP
HCV AB SERPL-IMP: SIGNIFICANT CHANGE UP
HDLC SERPL-MCNC: 43 MG/DL — LOW
HGB BLD-MCNC: 12.8 G/DL — SIGNIFICANT CHANGE UP (ref 11.5–15.5)
HIV 1+2 AB+HIV1 P24 AG SERPL QL IA: SIGNIFICANT CHANGE UP
IMM GRANULOCYTES NFR BLD AUTO: 0 % — SIGNIFICANT CHANGE UP (ref 0–1.5)
LIPID PNL WITH DIRECT LDL SERPL: 85 MG/DL — SIGNIFICANT CHANGE UP
LYMPHOCYTES # BLD AUTO: 1.25 K/UL — SIGNIFICANT CHANGE UP (ref 1–3.3)
LYMPHOCYTES # BLD AUTO: 35.5 % — SIGNIFICANT CHANGE UP (ref 13–44)
MAGNESIUM SERPL-MCNC: 1.6 MG/DL — SIGNIFICANT CHANGE UP (ref 1.6–2.6)
MCHC RBC-ENTMCNC: 33.2 GM/DL — SIGNIFICANT CHANGE UP (ref 32–36)
MCHC RBC-ENTMCNC: 33.2 PG — SIGNIFICANT CHANGE UP (ref 27–34)
MCV RBC AUTO: 100 FL — SIGNIFICANT CHANGE UP (ref 80–100)
MONOCYTES # BLD AUTO: 0.4 K/UL — SIGNIFICANT CHANGE UP (ref 0–0.9)
MONOCYTES NFR BLD AUTO: 11.4 % — SIGNIFICANT CHANGE UP (ref 2–14)
NEUTROPHILS # BLD AUTO: 1.86 K/UL — SIGNIFICANT CHANGE UP (ref 1.8–7.4)
NEUTROPHILS NFR BLD AUTO: 52.8 % — SIGNIFICANT CHANGE UP (ref 43–77)
NON HDL CHOLESTEROL: 102 MG/DL — SIGNIFICANT CHANGE UP
NRBC # BLD: 0 /100 WBCS — SIGNIFICANT CHANGE UP (ref 0–0)
PHOSPHATE SERPL-MCNC: 3 MG/DL — SIGNIFICANT CHANGE UP (ref 2.5–4.5)
PLATELET # BLD AUTO: 152 K/UL — SIGNIFICANT CHANGE UP (ref 150–400)
POTASSIUM SERPL-MCNC: 4.1 MMOL/L — SIGNIFICANT CHANGE UP (ref 3.5–5.3)
POTASSIUM SERPL-SCNC: 4.1 MMOL/L — SIGNIFICANT CHANGE UP (ref 3.5–5.3)
PROT SERPL-MCNC: 6.9 G/DL — SIGNIFICANT CHANGE UP (ref 6–8.3)
RBC # BLD: 3.86 M/UL — SIGNIFICANT CHANGE UP (ref 3.8–5.2)
RBC # FLD: 12.4 % — SIGNIFICANT CHANGE UP (ref 10.3–14.5)
SODIUM SERPL-SCNC: 142 MMOL/L — SIGNIFICANT CHANGE UP (ref 135–145)
TRIGL SERPL-MCNC: 86 MG/DL — SIGNIFICANT CHANGE UP
TROPONIN T SERPL-MCNC: 0.01 NG/ML — SIGNIFICANT CHANGE UP (ref 0–0.01)
TSH SERPL-MCNC: 0.5 UIU/ML — SIGNIFICANT CHANGE UP (ref 0.35–4.94)
WBC # BLD: 3.52 K/UL — LOW (ref 3.8–10.5)
WBC # FLD AUTO: 3.52 K/UL — LOW (ref 3.8–10.5)

## 2021-03-17 PROCEDURE — 93308 TTE F-UP OR LMTD: CPT | Mod: 26

## 2021-03-17 PROCEDURE — 99232 SBSQ HOSP IP/OBS MODERATE 35: CPT | Mod: CS

## 2021-03-17 RX ORDER — MAGNESIUM SULFATE 500 MG/ML
2 VIAL (ML) INJECTION ONCE
Refills: 0 | Status: COMPLETED | OUTPATIENT
Start: 2021-03-17 | End: 2021-03-17

## 2021-03-17 RX ORDER — ACETAMINOPHEN 500 MG
650 TABLET ORAL EVERY 6 HOURS
Refills: 0 | Status: DISCONTINUED | OUTPATIENT
Start: 2021-03-17 | End: 2021-03-19

## 2021-03-17 RX ORDER — LANOLIN ALCOHOL/MO/W.PET/CERES
5 CREAM (GRAM) TOPICAL AT BEDTIME
Refills: 0 | Status: DISCONTINUED | OUTPATIENT
Start: 2021-03-17 | End: 2021-03-19

## 2021-03-17 RX ORDER — ATORVASTATIN CALCIUM 80 MG/1
80 TABLET, FILM COATED ORAL AT BEDTIME
Refills: 0 | Status: DISCONTINUED | OUTPATIENT
Start: 2021-03-17 | End: 2021-03-17

## 2021-03-17 RX ORDER — ASPIRIN/CALCIUM CARB/MAGNESIUM 324 MG
81 TABLET ORAL DAILY
Refills: 0 | Status: DISCONTINUED | OUTPATIENT
Start: 2021-03-17 | End: 2021-03-19

## 2021-03-17 RX ORDER — METOPROLOL TARTRATE 50 MG
25 TABLET ORAL DAILY
Refills: 0 | Status: DISCONTINUED | OUTPATIENT
Start: 2021-03-17 | End: 2021-03-19

## 2021-03-17 RX ORDER — REMDESIVIR 5 MG/ML
100 INJECTION INTRAVENOUS EVERY 24 HOURS
Refills: 0 | Status: DISCONTINUED | OUTPATIENT
Start: 2021-03-18 | End: 2021-03-18

## 2021-03-17 RX ADMIN — Medication 6 MILLIGRAM(S): at 21:14

## 2021-03-17 RX ADMIN — Medication 25 MILLIGRAM(S): at 10:19

## 2021-03-17 RX ADMIN — REMDESIVIR 500 MILLIGRAM(S): 5 INJECTION INTRAVENOUS at 01:31

## 2021-03-17 RX ADMIN — Medication 650 MILLIGRAM(S): at 22:20

## 2021-03-17 RX ADMIN — Medication 5 MILLIGRAM(S): at 22:20

## 2021-03-17 RX ADMIN — Medication 81 MILLIGRAM(S): at 12:26

## 2021-03-17 RX ADMIN — Medication 650 MILLIGRAM(S): at 22:50

## 2021-03-17 RX ADMIN — ENOXAPARIN SODIUM 40 MILLIGRAM(S): 100 INJECTION SUBCUTANEOUS at 21:14

## 2021-03-17 RX ADMIN — Medication 100 MILLIGRAM(S): at 21:14

## 2021-03-17 RX ADMIN — Medication 50 GRAM(S): at 10:19

## 2021-03-17 RX ADMIN — Medication 20 MILLIEQUIVALENT(S): at 01:31

## 2021-03-17 NOTE — DISCHARGE NOTE PROVIDER - PROVIDER TOKENS
PROVIDER:[TOKEN:[[id number]],FOLLOWUP:[2 weeks],ESTABLISHEDPATIENT:[T]] PROVIDER:[TOKEN:[[id number]],FOLLOWUP:[2 weeks],ESTABLISHEDPATIENT:[T]],PROVIDER:[[id number]],FOLLOWUP:[2 weeks],ESTABLISHEDPATIENT:[T]]

## 2021-03-17 NOTE — DIETITIAN INITIAL EVALUATION ADULT. - PROBLEM SELECTOR PLAN 4
Patient inititally scheduled for cardiac cath now deferred in setting of positive covid-19 pcr.   -Echocardiogram (01/29/2021) showed moderate concentric LV hypertrophy w/ normal global wall motion and EF 65%, grade I diastolic dysfunction, RV normal size and systolic function, moderate AR, mild AS, mild MR w/ moderate calcification of mitral valve annulus, mild TR, trace WA, and no evidence of pericardial effusion.  -NST (02/15/2021) showed mild anteroapical and anteroseptal ischemia w/ normal biventricular size and function.  - on Toprol 25 mg daily and isosorbide dinitrite 30 mg daily.   - BP well controlled; will hold for now and can restart as HR and BP rise  - A1c and lipid panel to calculate ascvd, start statin as indicated  - c/w home aspirin    ADDENDUM: consult cardiology while inpatient for monitoring / management of cardiac disease while w/ covid.

## 2021-03-17 NOTE — DIETITIAN INITIAL EVALUATION ADULT. - OTHER INFO
69 y/o female, former smoker, w/ PMHx HTN and VINCE (does not use CPAP) presents today after having 5 days of SOB associated with cough with sputum production. Now admitted for acute hypoxic respiratory failure 2/2 to COVID-19.     On assessment, pt seen in room, caterina. Endorses good (>75%) intake of breakfast tray this AM. States her appetite is decreased 2/2 COVID but she reports pushing herself to eat because she knows she needs the energy. Endorses decreased anil/ intake x 3 days PTA- was having mostly soups during this time however denies any wt changes. Does report unintentional wt gain of 10 lbs x 1 year from her UBW of 150 lbs (is now 160 lbs) which she attributes to her routine changing 2/2 the pandemic. NKFA. Denies following any dietary restrictions PTA. Encouraged pt to continue with adequate intake to support increased needs 2/2 COVID dx. Educated on DASH TLC diet and indication. Pt receptive to edu. Pain: no complaints of pain during RD assessment GI: no current n/v/d/c noted- no bm yes this admission, will cont to monitor. Skin: WDL. See full recs below, RD to follow.

## 2021-03-17 NOTE — DIETITIAN INITIAL EVALUATION ADULT. - ADD RECOMMEND
1. Cont DASH TLC 2. Monitor POCT q6hrs while pt ordered for Decadron >> monitor need for CST CHO 3. Monitor %PO intake and need for addition of ONS 4. RD to remain available for additional interventions PRN

## 2021-03-17 NOTE — DIETITIAN INITIAL EVALUATION ADULT. - PROBLEM SELECTOR PLAN 3
Presents with 5 days of acute on chronic SOB. Lives with daughter who is now covid positive. Fever of 102, HR 93, and spo2 of 92% while on RA. Cough produtive with white sputum. Elevated inflammatory markers with elevated procal.   - c/w decadron for 10 days (3/16 -   - start remdesevir (3/16 -   - daily labs with CRP, ferritin, and d-dimer   - repeat procal if worsening fever curve (has not received abx yet)   - follow-up bcx   - follow-up sputum culture

## 2021-03-17 NOTE — PROGRESS NOTE ADULT - PROBLEM SELECTOR PLAN 4
Patient inititally scheduled for cardiac cath now deferred in setting of positive covid-19 pcr.   -Echocardiogram (01/29/2021) showed moderate concentric LV hypertrophy w/ normal global wall motion and EF 65%, grade I diastolic dysfunction, RV normal size and systolic function, moderate AR, mild AS, mild MR w/ moderate calcification of mitral valve annulus, mild TR, trace [place], and no evidence of pericardial effusion.  -NST (02/15/2021) showed mild anteroapical and anteroseptal ischemia w/ normal biventricular size and function.  - on Toprol 25 mg daily and isosorbide dinitrite 30 mg daily.   - BP well controlled; will hold for now and can restart as HR and BP rise  - A1c and lipid panel to calculate ascvd, start statin as indicated  - c/w home aspirin    ADDENDUM: consult cardiology while inpatient for monitoring / management of cardiac disease while w/ covid. Patient initially scheduled for cardiac cath now deferred in setting of positive covid-19 pcr. Echocardiogram (01/29/2021) showed moderate concentric LV hypertrophy w/ normal global wall motion and EF 65%, grade I diastolic dysfunction, RV normal size and systolic function, moderate AR, mild AS, mild MR w/ moderate calcification of mitral valve annulus, mild TR, trace [place], and no evidence of pericardial effusion.NST (02/15/2021) showed mild anteroapical and anteroseptal ischemia w/ normal biventricular size and function. at home on Toprol 25 mg daily and isosorbide dinitrite 30 mg daily.   - repeat TTE (3/17) showing hyperdynamic LV w/ LVEF is >75%, otherwise w/o wall motion abnormalities  - pt's 10yr ASCVD risk score 10.8%, therefore will start high dose statin  - will restart home toprol 25mg QD (3/17- )  - will continue to hold isosorbide dinitirite as BP currently stable  - c/w home aspirin

## 2021-03-17 NOTE — DISCHARGE NOTE PROVIDER - NSDCFUADDAPPT_GEN_ALL_CORE_FT
Please call [phone] to schedule a follow-up appointment with your outpatient Cardiologist, Dr. Amara Esposito, within 1 week after you leave the hospital.    Please call [phone] to schedule a follow-up appointment with your outpatient Primary Care Physician, Dr. Sharan Park, within 1-2 weeks after you leave the hospital.

## 2021-03-17 NOTE — DIETITIAN INITIAL EVALUATION ADULT. - OTHER CALCULATIONS
IBW used as pt exceeds 120% IBW (139%). Needs based on [facility] standards of care for older adults, adjusted for increased pro/ kcal needs 2/2 hypermetabolic state. Fluids per team d/t compromised respiratory status.

## 2021-03-17 NOTE — DISCHARGE NOTE PROVIDER - CARE PROVIDER_API CALL
Amara Esposito (MD)  Cardiovascular Disease; Interventional Cardiology  [address] [facility], [address] [place] [address]  Phone: [phone]  Fax: [phone]  Established Patient  Follow Up Time: 2 weeks   Amara Esposito (MD)  Cardiovascular Disease; Interventional Cardiology  [address] [facility], [address]  [place] [address]  Phone: [phone]  Fax: [phone]  Established Patient  Follow Up Time: 2 weeks    Sharan Park)  Medicine  [address] [place] [address]  Phone: [phone]  Fax: [phone]  Established Patient  Follow Up Time: 2 weeks

## 2021-03-17 NOTE — DISCHARGE NOTE PROVIDER - NSDCMRMEDTOKEN_GEN_ALL_CORE_FT
aspirin 81 mg oral tablet: 1 tab(s) orally once a day  ISOSORBIDE DINITRATE  30 MG TABS: 1 tab(s) orally once a day  METOPROLOL ER SUCCINATE 25MG TABS: TAKE 1 TABLET BY MOUTH DAILY   aspirin 81 mg oral tablet: 1 tab(s) orally once a day  dexamethasone 6 mg oral tablet: 1 tab(s) orally every 24 hours  ISOSORBIDE DINITRATE  30 MG TABS: 1 tab(s) orally once a day

## 2021-03-17 NOTE — DISCHARGE NOTE PROVIDER - HOSPITAL COURSE
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     Inpatient treatment course:     Problem List/Main Diagnoses:   #Acute hypoxic respiratory failure 2/2 COVID-19  Presents with 5 days of acute on chronic SOB. Lives with daughter who is now covid positive. Fever of 102, HR 93, and spo2 of 92% while on RA. Cough productive with white sputum. Procal 0.25. s/p remdesevir (3/16 - 3/18). BCx negative x2.  - Spo2 of 92% while on RA now on 2L NC.   - c/w decadron for 10 days (3/16 - 3/25)    #Bradycardia      #Ischemic heart disease  Patient initially scheduled for cardiac cath now deferred in setting of positive covid-19 pcr. Echocardiogram (01/29/2021) showed moderate concentric LV hypertrophy w/ normal global wall motion and EF 65%, grade I diastolic dysfunction, RV normal size and systolic function, moderate AR, mild AS, mild MR w/ moderate calcification of mitral valve annulus, mild TR, trace [place], and no evidence of pericardial effusion.NST (02/15/2021) showed mild anteroapical and anteroseptal ischemia w/ normal biventricular size and function. at home on Toprol 25 mg daily and isosorbide dinitrite 30 mg daily.   - repeat TTE (3/17) showing hyperdynamic LV w/ LVEF is >75%, otherwise w/o wall motion abnormalities  - pt's 10yr ASCVD risk score 10.8%, however statin contraindicated due to pt's known hx fatty liver disease  - will restart home toprol 25mg QD (3/17- )  - will continue to hold isosorbide dinitirite as BP currently stable  - c/w home aspirin.    #HTN   on Toprol 25 mg daily and isosorbide dinitrite 30 mg daily.     #VINCE  - History of VINCE with no CPAP at home.    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 Noted during hospital course for persistent symptomatic bradycardia ranging b/t 45-60 bpm.    Problem List/Main Diagnoses:   #Acute hypoxic respiratory failure 2/2 COVID-19  Presents with 5 days of acute on chronic SOB. Lives with daughter who is now covid positive. Fever of 102, HR 93, and spo2 of 92% while on RA. Cough productive with white sputum. Procal 0.25. s/p remdesevir (3/16 - 3/18). BCx negative x2.  - Spo2 of 92% while on RA now on 2L NC.   - will discharge on home O2 2L NC  - c/w decadron for 10 days (3/16 - 3/25)    #Bradycardia  Pt noted for persistent bradycardia b/t 45-60 bpm since 3/17. Home toprol since held. ECG showing sinus bradycardia w/ RBBB, otherwise w/ normal OH and QTc intervals. EP team consulted who suspect likely feature of COVID-19 infection, and do not recommend any additional work-up or monitoring at this time. On 3/19, pt reporting mild episode of dizziness in AM upon awakening however spontaneously resolved after 5 minutes without reoccurance. Likely unrelated to otherwise asymptomatic bradycardia. Orthostatics negative.  - Will continue to hold home Toprol in the setting of persistent asymptomatic bradycardia  - Will have patient follow-up w/ outpatient Cardiologist Dr. Esposito within 1 week for further evaluation and discussion on when to resume home BB.    #Ischemic heart disease  Patient initially scheduled for cardiac cath now deferred in setting of positive covid-19 pcr. Echocardiogram (01/29/2021) showed moderate concentric LV hypertrophy w/ normal global wall motion and EF 65%, grade I diastolic dysfunction, RV normal size and systolic function, moderate AR, mild AS, mild MR w/ moderate calcification of mitral valve annulus, mild TR, trace [place], and no evidence of pericardial effusion.NST (02/15/2021) showed mild anteroapical and anteroseptal ischemia w/ normal biventricular size and function. at home on Toprol 25 mg daily and isosorbide dinitrite 30 mg daily.   - repeat TTE (3/17) showing hyperdynamic LV w/ LVEF is >75%, otherwise w/o wall motion abnormalities  - pt's 10yr ASCVD risk score 10.8%, however statin contraindicated due to pt's known hx fatty liver disease  - will continue to hold isosorbide dinitirite as BP currently stable  - c/w home aspirin.  - Will continue to hold home Toprol in the setting of persistent asymptomatic bradycardia  - Will have patient follow-up w/ outpatient Cardiologist Dr. Esposito within 1 week    #HTN   on Toprol 25 mg daily and isosorbide dinitrite 30 mg daily.   - Will continue to hold home Toprol in the setting of persistent asymptomatic bradycardia    #VINCE  - History of VINCE with no CPAP at home.    New medications/therapies: decadron, home O2  New lines/hardware: none  Labs to be followed outpatient: none  Exam to be followed outpatient: none    Discharge plan: discharge to home

## 2021-03-17 NOTE — PROGRESS NOTE ADULT - PROBLEM SELECTOR PLAN 3
Presents with 5 days of acute on chronic SOB. Lives with daughter who is now covid positive. Fever of 102, HR 93, and spo2 of 92% while on RA. Cough produtive with white sputum. Elevated inflammatory markers with elevated procal.   - c/w decadron for 10 days (3/16 -   - start remdesevir (3/16 -   - daily labs with CRP, ferritin, and d-dimer   - repeat procal if worsening fever curve (has not received abx yet)   - follow-up bcx   - follow-up sputum culture Presents with 5 days of acute on chronic SOB. Lives with daughter who is now covid positive. Fever of 102, HR 93, and spo2 of 92% while on RA. Cough produtive with white sputum. Elevated inflammatory markers with elevated procal.   - c/w decadron for 10 days (3/16 -   - c/w remdesevir (3/16 -   - daily labs with CRP, ferritin, and d-dimer   - repeat procal if worsening fever curve (has not received abx yet)   - follow-up bcx   - follow-up sputum culture

## 2021-03-17 NOTE — DIETITIAN INITIAL EVALUATION ADULT. - PROBLEM SELECTOR PLAN 1
Meets 2/4 SIRS (HR, temp, and RR) with COVID-19 as the presumed source.   - covid-19 management as above   - no need for abx at this time, but with elevated procal of 0.25. If rising fever curve, start treatment for CAP.   - follow-up Bcx and sputum cx

## 2021-03-17 NOTE — PROGRESS NOTE ADULT - SUBJECTIVE AND OBJECTIVE BOX
SUBJECTIVE/OVERNIGHT EVENTS:     VITAL SIGNS:  Vital Signs Last 24 Hrs  T(C): 37.1 (17 Mar 2021 09:53), Max: 39.3 (16 Mar 2021 17:28)  T(F): 98.7 (17 Mar 2021 09:53), Max: 102.7 (16 Mar 2021 17:28)  HR: 61 (17 Mar 2021 12:33) (60 - 93)  BP: 107/53 (17 Mar 2021 12:33) (107/53 - 132/70)  BP(mean): --  RR: 20 (17 Mar 2021 12:33) (18 - 24)  SpO2: 95% (17 Mar 2021 12:33) (92% - 98%)    PHYSICAL EXAM:  General: NAD; speaking in full sentences  HEENT: NC/AT; PERRL; EOMI; MMM  Neck: supple; no JVD  Cardiac: RRR; +S1/S2  Pulm: CTA B/L; no W/R/R  GI: soft, NT/ND, +BS  Extremities: WWP; no edema, clubbing or cyanosis  Vasc: 2+ radial, DP pulses B/L  Neuro: AAOx3; no focal deficits    MEDICATIONS:  MEDICATIONS  (STANDING):  aspirin enteric coated 81 milliGRAM(s) Oral daily  atorvastatin 80 milliGRAM(s) Oral at bedtime  dexAMETHasone     Tablet 6 milliGRAM(s) Oral every 24 hours  enoxaparin Injectable 40 milliGRAM(s) SubCutaneous every 24 hours  metoprolol succinate ER 25 milliGRAM(s) Oral daily    MEDICATIONS  (PRN):      ALLERGIES:  Allergies    No Known Allergies    Intolerances        LABS:                        12.8   3.52  )-----------( 152      ( 17 Mar 2021 08:41 )             38.6     03-17    142  |  108  |  10  ----------------------------<  136<H>  4.1   |  23  |  0.56    Ca    8.5      17 Mar 2021 08:41  Phos  3.0     03-17  Mg     1.6     03-17    TPro  6.9  /  Alb  3.7  /  TBili  0.4  /  DBili  x   /  AST  82<H>  /  ALT  65<H>  /  AlkPhos  87  03-17    PT/INR - ( 16 Mar 2021 18:35 )   PT: 14.4 sec;   INR: 1.21          PTT - ( 16 Mar 2021 18:35 )  PTT:34.0 sec    RADIOLOGY & ADDITIONAL TESTS: Reviewed. SUBJECTIVE/OVERNIGHT EVENTS: Pt admitted overnight. No    VITAL SIGNS:  Vital Signs Last 24 Hrs  T(C): 37.1 (17 Mar 2021 09:53), Max: 39.3 (16 Mar 2021 17:28)  T(F): 98.7 (17 Mar 2021 09:53), Max: 102.7 (16 Mar 2021 17:28)  HR: 61 (17 Mar 2021 12:33) (60 - 93)  BP: 107/53 (17 Mar 2021 12:33) (107/53 - 132/70)  BP(mean): --  RR: 20 (17 Mar 2021 12:33) (18 - 24)  SpO2: 95% (17 Mar 2021 12:33) (92% - 98%)    PHYSICAL EXAM:  General: NAD; speaking in full sentences  HEENT: NC/AT; PERRL; EOMI; MMM  Neck: supple; no JVD  Cardiac: RRR; +S1/S2  Pulm: CTA B/L; no W/R/R  GI: soft, NT/ND, +BS  Extremities: WWP; no edema, clubbing or cyanosis  Vasc: 2+ radial, DP pulses B/L  Neuro: AAOx3; no focal deficits    MEDICATIONS:  MEDICATIONS  (STANDING):  aspirin enteric coated 81 milliGRAM(s) Oral daily  atorvastatin 80 milliGRAM(s) Oral at bedtime  dexAMETHasone     Tablet 6 milliGRAM(s) Oral every 24 hours  enoxaparin Injectable 40 milliGRAM(s) SubCutaneous every 24 hours  metoprolol succinate ER 25 milliGRAM(s) Oral daily    MEDICATIONS  (PRN):      ALLERGIES:  Allergies    No Known Allergies    Intolerances        LABS:                        12.8   3.52  )-----------( 152      ( 17 Mar 2021 08:41 )             38.6     03-17    142  |  108  |  10  ----------------------------<  136<H>  4.1   |  23  |  0.56    Ca    8.5      17 Mar 2021 08:41  Phos  3.0     03-17  Mg     1.6     03-17    TPro  6.9  /  Alb  3.7  /  TBili  0.4  /  DBili  x   /  AST  82<H>  /  ALT  65<H>  /  AlkPhos  87  03-17    PT/INR - ( 16 Mar 2021 18:35 )   PT: 14.4 sec;   INR: 1.21          PTT - ( 16 Mar 2021 18:35 )  PTT:34.0 sec    RADIOLOGY & ADDITIONAL TESTS: Reviewed. SUBJECTIVE/OVERNIGHT EVENTS: Pt admitted overnight. Pt seen in AM at bedside, resting supine in bed, breathing on 2L O2 via nasal cannula, and does not appear to be in any acute respiratory distress. She says her shortness of breath feels somewhat improved since prior to admission. She otherwise denies active chest pain, palpitations, wheezing, sputum production, nausea, vomiting, headache, changes in vision or hearing, abdominal pain, genitourinary sx, extremity pain or swelling.    VITAL SIGNS:  Vital Signs Last 24 Hrs  T(C): 37.1 (17 Mar 2021 09:53), Max: 39.3 (16 Mar 2021 17:28)  T(F): 98.7 (17 Mar 2021 09:53), Max: 102.7 (16 Mar 2021 17:28)  HR: 61 (17 Mar 2021 12:33) (60 - 93)  BP: 107/53 (17 Mar 2021 12:33) (107/53 - 132/70)  BP(mean): --  RR: 20 (17 Mar 2021 12:33) (18 - 24)  SpO2: 95% (17 Mar 2021 12:33) (92% - 98%)    PHYSICAL EXAM:  General: NAD; speaking in full sentences  HEENT: NC/AT; PERRL; EOMI; MMM  Neck: supple; no JVD  Cardiac: RRR; +S1/S2  Pulm: +reduced breath sounds in L lung field; otherwise no W/R/R  GI: soft, NT/ND, +BS  Extremities: WWP; no edema, clubbing or cyanosis  Vasc: 2+ radial, DP pulses B/L  Neuro: AAOx3; no focal deficits    MEDICATIONS:  MEDICATIONS  (STANDING):  aspirin enteric coated 81 milliGRAM(s) Oral daily  atorvastatin 80 milliGRAM(s) Oral at bedtime  dexAMETHasone     Tablet 6 milliGRAM(s) Oral every 24 hours  enoxaparin Injectable 40 milliGRAM(s) SubCutaneous every 24 hours  metoprolol succinate ER 25 milliGRAM(s) Oral daily    MEDICATIONS  (PRN):      ALLERGIES:  Allergies    No Known Allergies    Intolerances        LABS:                        12.8   3.52  )-----------( 152      ( 17 Mar 2021 08:41 )             38.6     03-17    142  |  108  |  10  ----------------------------<  136<H>  4.1   |  23  |  0.56    Ca    8.5      17 Mar 2021 08:41  Phos  3.0     03-17  Mg     1.6     03-17    TPro  6.9  /  Alb  3.7  /  TBili  0.4  /  DBili  x   /  AST  82<H>  /  ALT  65<H>  /  AlkPhos  87  03-17    PT/INR - ( 16 Mar 2021 18:35 )   PT: 14.4 sec;   INR: 1.21          PTT - ( 16 Mar 2021 18:35 )  PTT:34.0 sec    RADIOLOGY & ADDITIONAL TESTS: Reviewed.

## 2021-03-17 NOTE — DISCHARGE NOTE PROVIDER - NSDCCPCAREPLAN_GEN_ALL_CORE_FT
PRINCIPAL DISCHARGE DIAGNOSIS  Diagnosis: COVID-19  Assessment and Plan of Treatment:       SECONDARY DISCHARGE DIAGNOSES  Diagnosis: Hypoxia  Assessment and Plan of Treatment:      PRINCIPAL DISCHARGE DIAGNOSIS  Diagnosis: COVID-19  Assessment and Plan of Treatment: You came to the hospital due to worsening shortness of breath and some associated coughing. You were found to be positive for COVID-19 infection which is the likely cause of your recent worsening of symptoms. You received treatment speciically aimed to help manage COVID-19 symptoms which include remdesevir and decadron (steroid). In order to complete your treatment course please continue to take your Decadron 6mg tablets ONCE EACH DAY for 6 more days (Start 3/20; end after 3/25). In addition, please follow-up with your outpatient Primary Care Physician Dr. Park within 1-2 weeks after you leave the hospital.      SECONDARY DISCHARGE DIAGNOSES  Diagnosis: Sinus bradycardia  Assessment and Plan of Treatment: While you were in the hospital, you were noted to have an abnormally slow heart rate. When the heart beats too slow, it can impair the body's ability to deliver blood to the rest of your organs properly, and may therefore cause symptoms such as dizziness, lightheadedness, headache, and most severely, loss of consciousness (passing out). You were evalauted by the hospital's Electrophysiology team who determined that the most likely cause of your slow heart rate is due to your ongoing COVID-19 infection, and therefore your heart rate is expected to improve as your infection continues to resolve. However, it is very important that you follow-up with your outpatient Cardiologist within 1 week after you leave the hospital, for further monitoring and evaluation of your heart rate.

## 2021-03-18 LAB
ALBUMIN SERPL ELPH-MCNC: 3.4 G/DL — SIGNIFICANT CHANGE UP (ref 3.3–5)
ALP SERPL-CCNC: 86 U/L — SIGNIFICANT CHANGE UP (ref 40–120)
ALT FLD-CCNC: 54 U/L — HIGH (ref 10–45)
ANION GAP SERPL CALC-SCNC: 9 MMOL/L — SIGNIFICANT CHANGE UP (ref 5–17)
AST SERPL-CCNC: 58 U/L — HIGH (ref 10–40)
BASOPHILS # BLD AUTO: 0 K/UL — SIGNIFICANT CHANGE UP (ref 0–0.2)
BASOPHILS NFR BLD AUTO: 0 % — SIGNIFICANT CHANGE UP (ref 0–2)
BILIRUB SERPL-MCNC: 0.3 MG/DL — SIGNIFICANT CHANGE UP (ref 0.2–1.2)
BUN SERPL-MCNC: 12 MG/DL — SIGNIFICANT CHANGE UP (ref 7–23)
CALCIUM SERPL-MCNC: 8.5 MG/DL — SIGNIFICANT CHANGE UP (ref 8.4–10.5)
CHLORIDE SERPL-SCNC: 109 MMOL/L — HIGH (ref 96–108)
CO2 SERPL-SCNC: 24 MMOL/L — SIGNIFICANT CHANGE UP (ref 22–31)
CREAT SERPL-MCNC: 0.61 MG/DL — SIGNIFICANT CHANGE UP (ref 0.5–1.3)
CRP SERPL-MCNC: 7.9 MG/L — HIGH (ref 0–4)
D DIMER BLD IA.RAPID-MCNC: 160 NG/ML DDU — SIGNIFICANT CHANGE UP
EOSINOPHIL # BLD AUTO: 0 K/UL — SIGNIFICANT CHANGE UP (ref 0–0.5)
EOSINOPHIL NFR BLD AUTO: 0 % — SIGNIFICANT CHANGE UP (ref 0–6)
ERYTHROCYTE [SEDIMENTATION RATE] IN BLOOD: 34 MM/HR — HIGH
FERRITIN SERPL-MCNC: 992 NG/ML — HIGH (ref 15–150)
GLUCOSE BLDC GLUCOMTR-MCNC: 132 MG/DL — HIGH (ref 70–99)
GLUCOSE SERPL-MCNC: 164 MG/DL — HIGH (ref 70–99)
HCT VFR BLD CALC: 38.1 % — SIGNIFICANT CHANGE UP (ref 34.5–45)
HGB BLD-MCNC: 12.5 G/DL — SIGNIFICANT CHANGE UP (ref 11.5–15.5)
IMM GRANULOCYTES NFR BLD AUTO: 0.5 % — SIGNIFICANT CHANGE UP (ref 0–1.5)
LDH SERPL L TO P-CCNC: 335 U/L — HIGH (ref 50–242)
LYMPHOCYTES # BLD AUTO: 1.17 K/UL — SIGNIFICANT CHANGE UP (ref 1–3.3)
LYMPHOCYTES # BLD AUTO: 28.4 % — SIGNIFICANT CHANGE UP (ref 13–44)
MAGNESIUM SERPL-MCNC: 2 MG/DL — SIGNIFICANT CHANGE UP (ref 1.6–2.6)
MCHC RBC-ENTMCNC: 32.7 PG — SIGNIFICANT CHANGE UP (ref 27–34)
MCHC RBC-ENTMCNC: 32.8 GM/DL — SIGNIFICANT CHANGE UP (ref 32–36)
MCV RBC AUTO: 99.7 FL — SIGNIFICANT CHANGE UP (ref 80–100)
MONOCYTES # BLD AUTO: 0.23 K/UL — SIGNIFICANT CHANGE UP (ref 0–0.9)
MONOCYTES NFR BLD AUTO: 5.6 % — SIGNIFICANT CHANGE UP (ref 2–14)
NEUTROPHILS # BLD AUTO: 2.7 K/UL — SIGNIFICANT CHANGE UP (ref 1.8–7.4)
NEUTROPHILS NFR BLD AUTO: 65.5 % — SIGNIFICANT CHANGE UP (ref 43–77)
NRBC # BLD: 0 /100 WBCS — SIGNIFICANT CHANGE UP (ref 0–0)
PHOSPHATE SERPL-MCNC: 4 MG/DL — SIGNIFICANT CHANGE UP (ref 2.5–4.5)
PLATELET # BLD AUTO: 154 K/UL — SIGNIFICANT CHANGE UP (ref 150–400)
POTASSIUM SERPL-MCNC: 4.8 MMOL/L — SIGNIFICANT CHANGE UP (ref 3.5–5.3)
POTASSIUM SERPL-SCNC: 4.8 MMOL/L — SIGNIFICANT CHANGE UP (ref 3.5–5.3)
PROT SERPL-MCNC: 6.6 G/DL — SIGNIFICANT CHANGE UP (ref 6–8.3)
RBC # BLD: 3.82 M/UL — SIGNIFICANT CHANGE UP (ref 3.8–5.2)
RBC # FLD: 12.4 % — SIGNIFICANT CHANGE UP (ref 10.3–14.5)
SODIUM SERPL-SCNC: 142 MMOL/L — SIGNIFICANT CHANGE UP (ref 135–145)
WBC # BLD: 4.12 K/UL — SIGNIFICANT CHANGE UP (ref 3.8–10.5)
WBC # FLD AUTO: 4.12 K/UL — SIGNIFICANT CHANGE UP (ref 3.8–10.5)

## 2021-03-18 PROCEDURE — 99232 SBSQ HOSP IP/OBS MODERATE 35: CPT | Mod: CS

## 2021-03-18 RX ADMIN — ENOXAPARIN SODIUM 40 MILLIGRAM(S): 100 INJECTION SUBCUTANEOUS at 21:05

## 2021-03-18 RX ADMIN — REMDESIVIR 500 MILLIGRAM(S): 5 INJECTION INTRAVENOUS at 00:20

## 2021-03-18 RX ADMIN — Medication 5 MILLIGRAM(S): at 21:05

## 2021-03-18 RX ADMIN — Medication 100 MILLIGRAM(S): at 13:25

## 2021-03-18 RX ADMIN — Medication 6 MILLIGRAM(S): at 21:05

## 2021-03-18 RX ADMIN — Medication 100 MILLIGRAM(S): at 22:53

## 2021-03-18 RX ADMIN — Medication 100 MILLIGRAM(S): at 00:19

## 2021-03-18 RX ADMIN — Medication 25 MILLIGRAM(S): at 05:41

## 2021-03-18 RX ADMIN — Medication 81 MILLIGRAM(S): at 13:25

## 2021-03-18 RX ADMIN — Medication 100 MILLIGRAM(S): at 05:41

## 2021-03-18 RX ADMIN — Medication 100 MILLIGRAM(S): at 21:05

## 2021-03-18 NOTE — PROGRESS NOTE ADULT - ASSESSMENT
71 y/o female, former smoker, w/ PMHx HTN and VINCE (does not use CPAP) presents today after having 5 days of SOB associated with cough with sputum production. Now admitted for acute hypoxic respiratory failure 2/2 to COVID-19. 
69 y/o female, former smoker, w/ PMHx HTN and VINCE (does not use CPAP) presents today after having 5 days of SOB associated with cough with sputum production. Now admitted for acute hypoxic respiratory failure 2/2 to COVID-19.

## 2021-03-18 NOTE — PROGRESS NOTE ADULT - PROBLEM SELECTOR PLAN 6
History of VINCE with no CPAP at home.   - continue to monitor
History of VINCE with no CPAP at home.   - continue to monitor

## 2021-03-18 NOTE — PROGRESS NOTE ADULT - PROBLEM SELECTOR PROBLEM 3
Pneumonia due to COVID-19 virus Sepsis, due to unspecified organism, unspecified whether acute organ dysfunction present

## 2021-03-18 NOTE — PROGRESS NOTE ADULT - PROBLEM SELECTOR PLAN 1
Meets 2/4 SIRS (HR, temp, and RR) with COVID-19 as the presumed source.   - covid-19 management as above   - no need for abx at this time, but with elevated procal of 0.25. If rising fever curve, start treatment for CAP.   - follow-up Bcx and sputum cx Spo2 of 92% while on RA now on 2L NC  - wean oxygen as tolerated   - most likely due to covid-19 infection, management as above

## 2021-03-18 NOTE — PROGRESS NOTE ADULT - PROBLEM SELECTOR PLAN 2
Spo2 of 92% while on RA now on 2L NC  - wean oxygen as tolerated   - most likely due to covid-19 infection, management as above Presents with 5 days of acute on chronic SOB. Lives with daughter who is now covid positive. Fever of 102, HR 93, and spo2 of 92% while on RA. Cough produtive with white sputum. Elevated inflammatory markers with elevated procal.   - c/w decadron for 10 days (3/16 -   - c/w remdesevir (3/16 -   - daily labs with CRP, ferritin, and d-dimer   - repeat procal if worsening fever curve (has not received abx yet)   - follow-up bcx   - follow-up sputum culture Presents with 5 days of acute on chronic SOB. Lives with daughter who is now covid positive. Fever of 102, HR 93, and spo2 of 92% while on RA. Cough produtive with white sputum. Elevated inflammatory markers with elevated procal. s/p remdesevir (3/16 - 3/18)  - c/w decadron for 10 days (3/16 -   - daily labs with CRP, ferritin, and d-dimer   - repeat procal if worsening fever curve (has not received abx yet)   - follow-up bcx

## 2021-03-18 NOTE — PROGRESS NOTE ADULT - PROBLEM SELECTOR PLAN 3
Presents with 5 days of acute on chronic SOB. Lives with daughter who is now covid positive. Fever of 102, HR 93, and spo2 of 92% while on RA. Cough produtive with white sputum. Elevated inflammatory markers with elevated procal.   - c/w decadron for 10 days (3/16 -   - c/w remdesevir (3/16 -   - daily labs with CRP, ferritin, and d-dimer   - repeat procal if worsening fever curve (has not received abx yet)   - follow-up bcx   - follow-up sputum culture Initially presenting meeting 2/4 SIRS (HR, temp, and RR) with COVID-19 as the presumed source.   - RESOLVED, as SIRS negative (3/18)  - covid-19 management as above   - no need for abx at this time, but with elevated procal of 0.25. If rising fever curve, start treatment for CAP.   - follow-up Bcx and sputum cx

## 2021-03-18 NOTE — PROGRESS NOTE ADULT - SUBJECTIVE AND OBJECTIVE BOX
SUBJECTIVE/OVERNIGHT EVENTS: .    VITAL SIGNS:  Vital Signs Last 24 Hrs  T(C): 36.4 (18 Mar 2021 10:21), Max: 38.2 (17 Mar 2021 21:45)  T(F): 97.6 (18 Mar 2021 10:21), Max: 100.7 (17 Mar 2021 21:45)  HR: 55 (18 Mar 2021 05:20) (55 - 71)  BP: 121/55 (18 Mar 2021 05:20) (121/55 - 127/60)  BP(mean): 77 (18 Mar 2021 05:20) (77 - 86)  RR: --  SpO2: 91% (18 Mar 2021 05:20) (90% - 97%)    PHYSICAL EXAM:  General: NAD; speaking in full sentences  HEENT: NC/AT; PERRL; EOMI; MMM  Neck: supple; no JVD  Cardiac: RRR; +S1/S2  Pulm: CTA B/L; no W/R/R  GI: soft, NT/ND, +BS  Extremities: WWP; no edema, clubbing or cyanosis  Vasc: 2+ radial, DP pulses B/L  Neuro: AAOx3; no focal deficits    MEDICATIONS:  MEDICATIONS  (STANDING):  aspirin enteric coated 81 milliGRAM(s) Oral daily  benzonatate 100 milliGRAM(s) Oral every 8 hours  dexAMETHasone     Tablet 6 milliGRAM(s) Oral every 24 hours  enoxaparin Injectable 40 milliGRAM(s) SubCutaneous every 24 hours  metoprolol succinate ER 25 milliGRAM(s) Oral daily  remdesivir  IVPB 100 milliGRAM(s) IV Intermittent every 24 hours    MEDICATIONS  (PRN):  acetaminophen   Tablet .. 650 milliGRAM(s) Oral every 6 hours PRN Temp greater or equal to 38C (100.4F), Mild Pain (1 - 3)  melatonin 5 milliGRAM(s) Oral at bedtime PRN Sleep      ALLERGIES:  Allergies    No Known Allergies    Intolerances        LABS:                        12.5   4.12  )-----------( 154      ( 18 Mar 2021 07:05 )             38.1     03-18    142  |  109<H>  |  12  ----------------------------<  164<H>  4.8   |  24  |  0.61    Ca    8.5      18 Mar 2021 07:06  Phos  4.0     03-18  Mg     2.0     03-18    TPro  6.6  /  Alb  3.4  /  TBili  0.3  /  DBili  x   /  AST  58<H>  /  ALT  54<H>  /  AlkPhos  86  03-18    PT/INR - ( 16 Mar 2021 18:35 )   PT: 14.4 sec;   INR: 1.21          PTT - ( 16 Mar 2021 18:35 )  PTT:34.0 sec    RADIOLOGY & ADDITIONAL TESTS: Reviewed. SUBJECTIVE/OVERNIGHT EVENTS: Overnight patient noted for episode of asymptomatic bradycardia down to mid 50s noted following administration of home dose of Toprol. Otherwise no acute events overnight. Pt seen in AM at bedside, laying supine in bed, breathing comfortably on 2L O2 via NC, and does not appear to be in acute respiratory distress. She says that her breathing feels somewhat improved compared to yesterday, however still reports mild dyspnea and intermittent cough without sputum production. She otherwise denies subjective fever, chills, headache, nausea, vomiting, chest pain, palpitations, acute sob, wheezing, abdominal pain, genitourinary sx, extremity pain or swelling.    VITAL SIGNS:  Vital Signs Last 24 Hrs  T(C): 36.4 (18 Mar 2021 10:21), Max: 38.2 (17 Mar 2021 21:45)  T(F): 97.6 (18 Mar 2021 10:21), Max: 100.7 (17 Mar 2021 21:45)  HR: 55 (18 Mar 2021 05:20) (55 - 71)  BP: 121/55 (18 Mar 2021 05:20) (121/55 - 127/60)  BP(mean): 77 (18 Mar 2021 05:20) (77 - 86)  RR: --  SpO2: 91% (18 Mar 2021 05:20) (90% - 97%)    PHYSICAL EXAM:  General: NAD; speaking in full sentences  HEENT: NC/AT; PERRL; EOMI; MMM  Neck: supple; no JVD  Cardiac: RRR; +S1/S2  Pulm: +reduced breath sounds in L lung field; otherwise no W/R/R  GI: soft, NT/ND, +BS  Extremities: WWP; no edema, clubbing or cyanosis  Vasc: 2+ radial, DP pulses B/L  Neuro: AAOx3; no focal deficits    MEDICATIONS:  MEDICATIONS  (STANDING):  aspirin enteric coated 81 milliGRAM(s) Oral daily  benzonatate 100 milliGRAM(s) Oral every 8 hours  dexAMETHasone     Tablet 6 milliGRAM(s) Oral every 24 hours  enoxaparin Injectable 40 milliGRAM(s) SubCutaneous every 24 hours  metoprolol succinate ER 25 milliGRAM(s) Oral daily  remdesivir  IVPB 100 milliGRAM(s) IV Intermittent every 24 hours    MEDICATIONS  (PRN):  acetaminophen   Tablet .. 650 milliGRAM(s) Oral every 6 hours PRN Temp greater or equal to 38C (100.4F), Mild Pain (1 - 3)  melatonin 5 milliGRAM(s) Oral at bedtime PRN Sleep      ALLERGIES:  Allergies    No Known Allergies    Intolerances        LABS:                        12.5   4.12  )-----------( 154      ( 18 Mar 2021 07:05 )             38.1     03-18    142  |  109<H>  |  12  ----------------------------<  164<H>  4.8   |  24  |  0.61    Ca    8.5      18 Mar 2021 07:06  Phos  4.0     03-18  Mg     2.0     03-18    TPro  6.6  /  Alb  3.4  /  TBili  0.3  /  DBili  x   /  AST  58<H>  /  ALT  54<H>  /  AlkPhos  86  03-18    PT/INR - ( 16 Mar 2021 18:35 )   PT: 14.4 sec;   INR: 1.21          PTT - ( 16 Mar 2021 18:35 )  PTT:34.0 sec    RADIOLOGY & ADDITIONAL TESTS: Reviewed.

## 2021-03-18 NOTE — PROGRESS NOTE ADULT - PROBLEM SELECTOR PLAN 5
- on Toprol 25 mg daily and isosorbide dinitrite 30 mg daily.   - BP well controlled; will hold for now and can restart as HR and BP rise
- on Toprol 25 mg daily and isosorbide dinitrite 30 mg daily.   - BP well controlled; will hold for now and can restart as HR and BP rise

## 2021-03-18 NOTE — PROGRESS NOTE ADULT - PROBLEM SELECTOR PLAN 7
F- None  E-replete as needed  N- DASH diet   DVt ppx: Lovenox
F- None  E-replete as needed  N- DASH diet   DVt ppx: Lovenox

## 2021-03-18 NOTE — PROGRESS NOTE ADULT - PROBLEM SELECTOR PLAN 4
Patient initially scheduled for cardiac cath now deferred in setting of positive covid-19 pcr. Echocardiogram (01/29/2021) showed moderate concentric LV hypertrophy w/ normal global wall motion and EF 65%, grade I diastolic dysfunction, RV normal size and systolic function, moderate AR, mild AS, mild MR w/ moderate calcification of mitral valve annulus, mild TR, trace LA, and no evidence of pericardial effusion.NST (02/15/2021) showed mild anteroapical and anteroseptal ischemia w/ normal biventricular size and function. at home on Toprol 25 mg daily and isosorbide dinitrite 30 mg daily.   - repeat TTE (3/17) showing hyperdynamic LV w/ LVEF is >75%, otherwise w/o wall motion abnormalities  - pt's 10yr ASCVD risk score 10.8%, therefore will start high dose statin  - will restart home toprol 25mg QD (3/17- )  - will continue to hold isosorbide dinitirite as BP currently stable  - c/w home aspirin Patient initially scheduled for cardiac cath now deferred in setting of positive covid-19 pcr. Echocardiogram (01/29/2021) showed moderate concentric LV hypertrophy w/ normal global wall motion and EF 65%, grade I diastolic dysfunction, RV normal size and systolic function, moderate AR, mild AS, mild MR w/ moderate calcification of mitral valve annulus, mild TR, trace RI, and no evidence of pericardial effusion.NST (02/15/2021) showed mild anteroapical and anteroseptal ischemia w/ normal biventricular size and function. at home on Toprol 25 mg daily and isosorbide dinitrite 30 mg daily.   - repeat TTE (3/17) showing hyperdynamic LV w/ LVEF is >75%, otherwise w/o wall motion abnormalities  - pt's 10yr ASCVD risk score 10.8%, however statin contraindicated due to pt's known hx fatty liver disease  - will restart home toprol 25mg QD (3/17- )  - will continue to hold isosorbide dinitirite as BP currently stable  - c/w home aspirin

## 2021-03-19 ENCOUNTER — TRANSCRIPTION ENCOUNTER (OUTPATIENT)
Age: 71
End: 2021-03-19

## 2021-03-19 VITALS — TEMPERATURE: 99 F

## 2021-03-19 LAB
ALBUMIN SERPL ELPH-MCNC: 3.6 G/DL — SIGNIFICANT CHANGE UP (ref 3.3–5)
ALP SERPL-CCNC: 85 U/L — SIGNIFICANT CHANGE UP (ref 40–120)
ALT FLD-CCNC: 43 U/L — SIGNIFICANT CHANGE UP (ref 10–45)
ANION GAP SERPL CALC-SCNC: 12 MMOL/L — SIGNIFICANT CHANGE UP (ref 5–17)
AST SERPL-CCNC: 39 U/L — SIGNIFICANT CHANGE UP (ref 10–40)
BILIRUB SERPL-MCNC: 0.3 MG/DL — SIGNIFICANT CHANGE UP (ref 0.2–1.2)
BUN SERPL-MCNC: 14 MG/DL — SIGNIFICANT CHANGE UP (ref 7–23)
CALCIUM SERPL-MCNC: 9.3 MG/DL — SIGNIFICANT CHANGE UP (ref 8.4–10.5)
CHLORIDE SERPL-SCNC: 106 MMOL/L — SIGNIFICANT CHANGE UP (ref 96–108)
CO2 SERPL-SCNC: 24 MMOL/L — SIGNIFICANT CHANGE UP (ref 22–31)
CREAT SERPL-MCNC: 0.62 MG/DL — SIGNIFICANT CHANGE UP (ref 0.5–1.3)
GLUCOSE SERPL-MCNC: 178 MG/DL — HIGH (ref 70–99)
HCT VFR BLD CALC: 39.6 % — SIGNIFICANT CHANGE UP (ref 34.5–45)
HGB BLD-MCNC: 13 G/DL — SIGNIFICANT CHANGE UP (ref 11.5–15.5)
MAGNESIUM SERPL-MCNC: 1.8 MG/DL — SIGNIFICANT CHANGE UP (ref 1.6–2.6)
MCHC RBC-ENTMCNC: 32.8 GM/DL — SIGNIFICANT CHANGE UP (ref 32–36)
MCHC RBC-ENTMCNC: 33.4 PG — SIGNIFICANT CHANGE UP (ref 27–34)
MCV RBC AUTO: 101.8 FL — HIGH (ref 80–100)
NRBC # BLD: 0 /100 WBCS — SIGNIFICANT CHANGE UP (ref 0–0)
PHOSPHATE SERPL-MCNC: 4.3 MG/DL — SIGNIFICANT CHANGE UP (ref 2.5–4.5)
PLATELET # BLD AUTO: 175 K/UL — SIGNIFICANT CHANGE UP (ref 150–400)
POTASSIUM SERPL-MCNC: 4.7 MMOL/L — SIGNIFICANT CHANGE UP (ref 3.5–5.3)
POTASSIUM SERPL-SCNC: 4.7 MMOL/L — SIGNIFICANT CHANGE UP (ref 3.5–5.3)
PROT SERPL-MCNC: 6.7 G/DL — SIGNIFICANT CHANGE UP (ref 6–8.3)
RBC # BLD: 3.89 M/UL — SIGNIFICANT CHANGE UP (ref 3.8–5.2)
RBC # FLD: 12.6 % — SIGNIFICANT CHANGE UP (ref 10.3–14.5)
SODIUM SERPL-SCNC: 142 MMOL/L — SIGNIFICANT CHANGE UP (ref 135–145)
WBC # BLD: 4.83 K/UL — SIGNIFICANT CHANGE UP (ref 3.8–10.5)
WBC # FLD AUTO: 4.83 K/UL — SIGNIFICANT CHANGE UP (ref 3.8–10.5)

## 2021-03-19 PROCEDURE — 99238 HOSP IP/OBS DSCHRG MGMT 30/<: CPT | Mod: CS

## 2021-03-19 RX ORDER — SODIUM CHLORIDE 9 MG/ML
500 INJECTION, SOLUTION INTRAVENOUS
Refills: 0 | Status: DISCONTINUED | OUTPATIENT
Start: 2021-03-19 | End: 2021-03-19

## 2021-03-19 RX ORDER — METOPROLOL TARTRATE 50 MG
0 TABLET ORAL
Qty: 0 | Refills: 3 | DISCHARGE

## 2021-03-19 RX ORDER — MAGNESIUM SULFATE 500 MG/ML
1 VIAL (ML) INJECTION ONCE
Refills: 0 | Status: COMPLETED | OUTPATIENT
Start: 2021-03-19 | End: 2021-03-19

## 2021-03-19 RX ADMIN — Medication 100 MILLIGRAM(S): at 13:24

## 2021-03-19 RX ADMIN — Medication 100 MILLIGRAM(S): at 05:50

## 2021-03-19 RX ADMIN — Medication 100 MILLIGRAM(S): at 05:43

## 2021-03-19 RX ADMIN — SODIUM CHLORIDE 500 MILLILITER(S): 9 INJECTION, SOLUTION INTRAVENOUS at 12:08

## 2021-03-19 RX ADMIN — Medication 100 GRAM(S): at 09:12

## 2021-03-19 RX ADMIN — Medication 81 MILLIGRAM(S): at 11:23

## 2021-03-19 NOTE — DISCHARGE NOTE NURSING/CASE MANAGEMENT/SOCIAL WORK - PATIENT PORTAL LINK FT
You can access the FollowMyHealth Patient Portal offered by [facility] by registering at the following website: [email or web address]. By joining [employer]’s FollowMyHealth portal, you will also be able to view your health information using other applications (apps) compatible with our system.

## 2021-03-19 NOTE — PROGRESS NOTE ADULT - SUBJECTIVE AND OBJECTIVE BOX
Medicine Progress Note    Patient is a 70y old  Female who presents with a chief complaint of COVID PNA (19 Mar 2021 13:06)      SUBJECTIVE / OVERNIGHT EVENTS:    ADDITIONAL REVIEW OF SYSTEMS:    MEDICATIONS  (STANDING):  aspirin enteric coated 81 milliGRAM(s) Oral daily  benzonatate 100 milliGRAM(s) Oral every 8 hours  dexAMETHasone     Tablet 6 milliGRAM(s) Oral every 24 hours  enoxaparin Injectable 40 milliGRAM(s) SubCutaneous every 24 hours  lactated ringers. 500 milliLiter(s) (500 mL/Hr) IV Continuous <Continuous>  metoprolol succinate ER 25 milliGRAM(s) Oral daily    MEDICATIONS  (PRN):  acetaminophen   Tablet .. 650 milliGRAM(s) Oral every 6 hours PRN Temp greater or equal to 38C (100.4F), Mild Pain (1 - 3)  guaiFENesin   Syrup  (Sugar-Free) 100 milliGRAM(s) Oral every 6 hours PRN Cough  melatonin 5 milliGRAM(s) Oral at bedtime PRN Sleep    CAPILLARY BLOOD GLUCOSE        I&O's Summary      PHYSICAL EXAM:  Vital Signs Last 24 Hrs  T(C): 36.7 (19 Mar 2021 12:50), Max: 36.8 (18 Mar 2021 18:10)  T(F): 98 (19 Mar 2021 12:50), Max: 98.3 (18 Mar 2021 18:10)  HR: 56 (19 Mar 2021 13:28) (48 - 61)  BP: 111/63 (19 Mar 2021 13:28) (105/59 - 137/78)  BP(mean): 79 (19 Mar 2021 08:24) (77 - 93)  RR: 17 (19 Mar 2021 13:28) (16 - 19)  SpO2: 96% (19 Mar 2021 13:28) (92% - 98%)  CONSTITUTIONAL: NAD, well-developed, well-groomed  ENMT: Moist oral mucosa, no pharyngeal injection or exudates; normal dentition  RESPIRATORY: Normal respiratory effort; lungs are clear to auscultation bilaterally  CARDIOVASCULAR: Regular rate and rhythm, normal S1 and S2, no murmur/rub/gallop; No lower extremity edema; Peripheral pulses are 2+ bilaterally  ABDOMEN: Nontender to palpation, normoactive bowel sounds, no rebound/guarding; No hepatosplenomegaly  PSYCH: A+O to person, place, and time; affect appropriate  NEUROLOGY: CN 2-12 are intact and symmetric; no gross sensory deficits   SKIN: No rashes; no palpable lesions    LABS:                        13.0   4.83  )-----------( 175      ( 19 Mar 2021 07:26 )             39.6     03-19    142  |  106  |  14  ----------------------------<  178<H>  4.7   |  24  |  0.62    Ca    9.3      19 Mar 2021 07:25  Phos  4.3     03-19  Mg     1.8     03-19    TPro  6.7  /  Alb  3.6  /  TBili  0.3  /  DBili  x   /  AST  39  /  ALT  43  /  AlkPhos  85  03-19              Culture - Blood (collected 17 Mar 2021 06:38)  Source: .Blood Blood-Peripheral  Preliminary Report (19 Mar 2021 07:01):    No growth at 2 days.    Culture - Blood (collected 17 Mar 2021 06:38)  Source: .Blood Blood-Peripheral  Preliminary Report (19 Mar 2021 07:01):    No growth at 2 days.      SARS-CoV-2: Detected (16 Mar 2021 20:57)  COVID-19 PCR: Detected (16 Mar 2021 18:37)      RADIOLOGY & ADDITIONAL TESTS:  Imaging from Last 24 Hours:    Electrocardiogram/QTc Interval:    COORDINATION OF CARE:  Care Discussed with Consultants/Other Providers:

## 2021-03-19 NOTE — CONSULT NOTE ADULT - SUBJECTIVE AND OBJECTIVE BOX
HPI:    PAST MEDICAL & SURGICAL HISTORY:  Obstructive sleep apnea    Hypertension    No significant past surgical history        No pertinent family history in first degree relatives        Social History:no smoking, no drugs, no algohol    pertinent home medications:    Inpatient Medications:   acetaminophen   Tablet .. 650 milliGRAM(s) Oral every 6 hours PRN  aspirin enteric coated 81 milliGRAM(s) Oral daily  benzonatate 100 milliGRAM(s) Oral every 8 hours  dexAMETHasone     Tablet 6 milliGRAM(s) Oral every 24 hours  enoxaparin Injectable 40 milliGRAM(s) SubCutaneous every 24 hours  guaiFENesin   Syrup  (Sugar-Free) 100 milliGRAM(s) Oral every 6 hours PRN  lactated ringers. 500 milliLiter(s) IV Continuous <Continuous>  melatonin 5 milliGRAM(s) Oral at bedtime PRN  metoprolol succinate ER 25 milliGRAM(s) Oral daily      Allergies: No Known Allergies      ROS:   CONSTITUTIONAL: No fever, weight loss + fatigue  EYES: Pt denies  RESPIRATORY: No cough, wheezing, chills or hemoptysis; No Shortness of Breath  CARDIOVASCULAR: see HPI  GASTROINTESTINAL: Pt denies  NEUROLOGICAL: Pt denies  SKIN: Pt denies   PSYCHIATRIC: Pt denies  HEME/LYMPH: Pt denies    PHYSICAL:  T(C): 36.7 (03-19-21 @ 12:50), Max: 36.8 (03-18-21 @ 18:10)  HR: 61 (03-19-21 @ 08:24) (48 - 61)  BP: 105/59 (03-19-21 @ 08:24) (105/59 - 137/78)  RR: 16 (03-19-21 @ 08:24) (16 - 19)  SpO2: 97% (03-19-21 @ 08:24) (92% - 98%)  Wt(kg): --  Appearance: No acute distress, well developed  Eyes: normal appearing conjunctiva, pupils and eyelids  Cardiovascular: Normal S1 S2, No JVD, No murmurs, No edema  Respiratory: Lungs clear to auscultation	bilaterally.  No wheeze, rhonchi, rales noted  Gastrointestinal:  Soft, NT/ND 	  Neurologic:  No deficit noted  Psych: A&Ox3, normal mood/affect  Musculoskeletal: normal gait  Skin: no rash noted, normal color and pigmentation.        LABS:                        13.0   4.83  )-----------( 175      ( 19 Mar 2021 07:26 )             39.6     03-19    142  |  106  |  14  ----------------------------<  178<H>  4.7   |  24  |  0.62    Ca    9.3      19 Mar 2021 07:25  Phos  4.3     03-19  Mg     1.8     03-19    TPro  6.7  /  Alb  3.6  /  TBili  0.3  /  DBili  x   /  AST  39  /  ALT  43  /  AlkPhos  85  03-19      TSH  Troponin    EKG:    Telemetry:    ECHO:    Prior EP procedures:    Cath / stress / Cardiac CTa:    Assessment Plan:         HPI:  Pt is a 71 y/o female, former smoker and 9/11 environmental exposure, w/ pulm fibrosis, ischemic heart disease, HTN and VINCE (does not use CPAP) and known RBBB presented on 3/16 w/ c/o of progressively worsening SOB x 5 days with productive cough.     Briefly, patient endorses having progressive SOB for the last 6 months and had been seeing a pulmonologist for her pulm fibrosis. Previously, she had no functional limitations, but as of recently, she becomes SOB while walking up 2 flights of stairs or several city blocks. In the recent week, her daughter, with whom she lives, became sick with COVID. Her shortness of breath worsened and decided to come to the ED for evaluation. She endorses a headache, diarrhea, subejctive fever/chills and SOB improved with nasal cannula at 2 L/min. She denies vision changes, CP, palpitations, n/v/c, dysuria, muscle/joint pain.     Of note, her echocardiogram (01/29/2021) showed moderate concentric LV hypertrophy w/ normal global wall motion and EF 65%, grade I diastolic dysfunction, RV normal size and systolic function, moderate AR, mild AS, mild MR w/ moderate calcification of mitral valve annulus, mild TR, trace WY, and no evidence of pericardial effusion. She also had a NST (02/15/2021) showed mild anteroapical and anteroseptal ischemia w/ normal biventricular size and function. She was seeing her cardiologist, Dr. Esposito, who had referred for an elective cardiac cath on 3/15, but this was rescheduled when she was found to be covid positive on 3/12.     In the ED, her vitals were Temp 102 F, HR 93, /79, RR 20, Spo2 92% while on RA now improved to 98% on NC 2L/min. Her labs are s/f for platelts 143, d-dimer 235, K 3,4m calcium 8.3, AST//73, CRP 8.7, ferritin 1242, procal 0.25, and BNP of 493. CXR s/f for bilateral infiltrates with possible consolidation on the left lower lobe. EKG with RBBB. In the ED, the patient received decadron 6 mg x1 and admitted to 3 [facility]ossein for further management of her respiratory insufficiency.    While on 3 hossein, she endorses that she got up from a supine position to go to the bathroom and felt lightheaded. An EKG was done that showed she had a HR of 46 bpm with a known RBBB. She does not report any palpitations, chest discomfort/ pain. Denies losing consciousness and describes lightheadedness and brief and has not occurred again since. EP consulted for further evaluation and recs for sinus bradycardia and episode of lightheadedness. Her mouth is noted to be very dry on exam and she is speaking in full sentences on RA. She is off telemetry, pulse ox is reading pulse to be 56-57 bpm on monitor, equal to manual pulse. She endorses breathing is better and has not felt lightheadedness since. Episode of lightheadedness likely orthostatic.    PAST MEDICAL & SURGICAL HISTORY:  Obstructive sleep apnea  Hypertension  ischemic heart disease    No significant past surgical history    No pertinent family history in first degree relatives    Social History: no smoking, no drugs, no alcohol    Home medications:  · 	METOPROLOL ER SUCCINATE 25MG TABS: Last Dose Taken:  , TAKE 1 TABLET BY MOUTH DAILY  · 	ISOSORBIDE DINITRATE  30 MG TABS: Last Dose Taken:  , 1 tab(s) orally once a day  · 	aspirin 81 mg oral tablet: 1 tab(s) orally once a day    Inpatient Medications:   acetaminophen   Tablet .. 650 milliGRAM(s) Oral every 6 hours PRN  aspirin enteric coated 81 milliGRAM(s) Oral daily  benzonatate 100 milliGRAM(s) Oral every 8 hours  dexAMETHasone     Tablet 6 milliGRAM(s) Oral every 24 hours  enoxaparin Injectable 40 milliGRAM(s) SubCutaneous every 24 hours  guaiFENesin   Syrup  (Sugar-Free) 100 milliGRAM(s) Oral every 6 hours PRN  lactated ringers. 500 milliLiter(s) IV Continuous <Continuous>  melatonin 5 milliGRAM(s) Oral at bedtime PRN  metoprolol succinate ER 25 milliGRAM(s) Oral daily    Allergies: No Known Allergies    ROS:   CONSTITUTIONAL: No fever, weight loss, fatigue  EYES: Pt denies  RESPIRATORY: progressively worsening SOB x 6 months, + cough, no wheezing, chills or hemoptysis  CARDIOVASCULAR: No palipitations, CP or discomfort.  GASTROINTESTINAL: Pt denies  NEUROLOGICAL: Pt denies  SKIN: Pt denies   PSYCHIATRIC: Anxiety   HEME/LYMPH: Pt denies    PHYSICAL:  T(C): 36.7 (03-19-21 @ 12:50), Max: 36.8 (03-18-21 @ 18:10)  HR: 61 (03-19-21 @ 08:24) (48 - 61)  BP: 105/59 (03-19-21 @ 08:24) (105/59 - 137/78)  RR: 16 (03-19-21 @ 08:24) (16 - 19)  SpO2: 97% (03-19-21 @ 08:24) (92% - 98%)    Appearance: No acute distress, well developed  Eyes: normal appearing conjunctiva, pupils and eyelids  Cardiovascular: Normal S1 S2, No JVD, No murmurs, No edema  Respiratory: Lungs clear to auscultation	bilaterally.  No wheeze, rhonchi, rales noted  Gastrointestinal:  Soft, NT/ND 	  Neurologic:  No deficit noted  Psych: A&Ox3, normal mood/affect  Musculoskeletal: normal gait  Skin: no rash noted, normal color and pigmentation.        LABS:                        13.0   4.83  )-----------( 175      ( 19 Mar 2021 07:26 )             39.6     142  |  106  |  14  ----------------------------<  178<H>  4.7   |  24  |  0.62    Ca    9.3      19 Mar 2021 07:25  Phos  4.3     03-19  Mg     1.8     03-19    TPro  6.7  /  Alb  3.6  /  TBili  0.3  /  DBili  x   /  AST  39  /  ALT  43  /  AlkPhos  85  03-19    TSH 0.501 uIU/mL (03.17.21 @ 08:41)   Troponin 0.01 (3/17), 0.01 (3/16)    EKG:   3/16 Normal sinus rhythm at 81 bpm with incomplete RBBB (she has a known RBBB) and LAFB; TWI in v1-v3, some TW flattening in III  3/18 Sinus bradycardia at rate of 49 bpm with an incomplete RBBB; Borderline LAFB, relatively unchanged  3/19 Sinus bradycardia at a rate of 47 bpm unchanged from previous    Telemetry: Not on telemetry; pulse ox reading a pulse of 57; manual pulse matches    ECHO:  TTE Limited Echo w/o Cont (03.17.21 @ 10:58)  LVEF is >75%.   1. Left ventricular hypertrophy present. Limited views obtained with no obvious wall motion abnormalities and hyperdynamic LV systolic function overall. LVEF is >75%.   2. The right ventricle is not well visualized, but appears to be grossly normal in structure and function.   3. Spectral and color Doppler was of the cardiac valves was not obtained.   4. No pericardial effusion.    Echocardiogram (01/29/2021) showed moderate concentric LV hypertrophy w/ normal global wall motion and EF 65%, grade I diastolic dysfunction, RV normal size and systolic function, moderate AR, mild AS, mild MR w/ moderate calcification of mitral valve annulus, mild TR, trace WY, and no evidence of pericardial effusion.    NST (02/15/2021) showed mild anteroapical and anteroseptal ischemia w/ normal biventricular size and function.    Prior EP procedures: none    Assessment Plan:  Pt is a 71 y/o female, former smoker and 9/11 environmental exposure, w/ pulm fibrosis, ischemic heart disease, HTN and VINCE (does not use CPAP) and known RBBB presented on 3/16 w/ c/o of progressively worsening SOB x 5 days with productive cough admitted to [address] [place] [address] for resp insufficiency req'ing NC 1-2L, now improved. EP consulted for bradycardia to 40s and lightheadedness x1 when getting up to go to bathroom; lightheadedness is likely orthostatic based on pt history and clinical data +/- ischemic heart disease.  - needs orthostatics  - oral hydration  - will need an ischemic work up once covid neg  - should follow up with pulm and cardiology after discharge   HPI:  Pt is a 69 y/o female, former smoker and 9/11 environmental exposure, w/ pulm fibrosis, ischemic heart disease, HTN and VINCE (does not use CPAP) and known RBBB presented on 3/16 w/ c/o of progressively worsening SOB x 5 days with productive cough.     Briefly, patient endorses having progressive SOB for the last 6 months and had been seeing a pulmonologist for her pulm fibrosis. Previously, she had no functional limitations, but as of recently, she becomes SOB while walking up 2 flights of stairs or several city blocks. In the recent week, her daughter, with whom she lives, became sick with COVID. Her shortness of breath worsened and decided to come to the ED for evaluation. She endorses a headache, diarrhea, subejctive fever/chills and SOB improved with nasal cannula at 2 L/min. She denies vision changes, CP, palpitations, n/v/c, dysuria, muscle/joint pain.     Of note, her echocardiogram (01/29/2021) showed moderate concentric LV hypertrophy w/ normal global wall motion and EF 65%, grade I diastolic dysfunction, RV normal size and systolic function, moderate AR, mild AS, mild MR w/ moderate calcification of mitral valve annulus, mild TR, trace OH, and no evidence of pericardial effusion. She also had a NST (02/15/2021) showed mild anteroapical and anteroseptal ischemia w/ normal biventricular size and function. She was seeing her cardiologist, Dr. Esposito, who had referred for an elective cardiac cath on 3/15, but this was rescheduled when she was found to be covid positive on 3/12.     In the ED, her vitals were Temp 102 F, HR 93, /79, RR 20, Spo2 92% while on RA now improved to 98% on NC 2L/min. Her labs are s/f for platelts 143, d-dimer 235, K 3,4m calcium 8.3, AST//73, CRP 8.7, ferritin 1242, procal 0.25, and BNP of 493. CXR s/f for bilateral infiltrates with possible consolidation on the left lower lobe. EKG with RBBB. In the ED, the patient received decadron 6 mg x1 and admitted to 3 [facility] for further management of her respiratory insufficiency.    While on 3 hossein, she endorses that she got up from a supine position to go to the bathroom and felt lightheaded. An EKG was done that showed she had a HR of 46 bpm with a known RBBB. She does not report any palpitations, chest discomfort/ pain. Denies losing consciousness and describes lightheadedness as brief and has not occurred again since. EP consulted for further evaluation and recs for sinus bradycardia and episode of lightheadedness. Her mouth is noted to be very dry on exam and she is speaking in full sentences on RA. She is off telemetry, pulse ox is reading pulse to be 56-57 bpm on monitor, equal to manual pulse. She endorses breathing is better and has not felt lightheadedness since. Episode of lightheadedness likely orthostatic.    PAST MEDICAL & SURGICAL HISTORY:  Obstructive sleep apnea  Hypertension  ischemic heart disease  pulm fibrosis    No pertinent family history in first degree relatives    Social History: no smoking, no drugs, no alcohol    Home medications:  · 	METOPROLOL ER SUCCINATE 25MG TABS: Last Dose Taken:  , TAKE 1 TABLET BY MOUTH DAILY  · 	ISOSORBIDE DINITRATE  30 MG TABS: Last Dose Taken:  , 1 tab(s) orally once a day  · 	aspirin 81 mg oral tablet: 1 tab(s) orally once a day    Inpatient Medications:   acetaminophen   Tablet .. 650 milliGRAM(s) Oral every 6 hours PRN  aspirin enteric coated 81 milliGRAM(s) Oral daily  benzonatate 100 milliGRAM(s) Oral every 8 hours  dexAMETHasone     Tablet 6 milliGRAM(s) Oral every 24 hours  enoxaparin Injectable 40 milliGRAM(s) SubCutaneous every 24 hours  guaiFENesin   Syrup  (Sugar-Free) 100 milliGRAM(s) Oral every 6 hours PRN  lactated ringers. 500 milliLiter(s) IV Continuous <Continuous>  melatonin 5 milliGRAM(s) Oral at bedtime PRN  metoprolol succinate ER 25 milliGRAM(s) Oral daily    Allergies: No Known Allergies    ROS:   CONSTITUTIONAL: No fever, weight loss, fatigue  EYES: Pt denies  RESPIRATORY: progressively worsening SOB x 6 months, + cough, no wheezing, chills or hemoptysis  CARDIOVASCULAR: No palipitations, CP or discomfort.  GASTROINTESTINAL: Pt denies  NEUROLOGICAL: Pt denies  SKIN: Pt denies   PSYCHIATRIC: Anxiety   HEME/LYMPH: Pt denies    PHYSICAL:  T(C): 36.7 (03-19-21 @ 12:50), Max: 36.8 (03-18-21 @ 18:10)  HR: 61 (03-19-21 @ 08:24) (48 - 61)  BP: 105/59 (03-19-21 @ 08:24) (105/59 - 137/78)  RR: 16 (03-19-21 @ 08:24) (16 - 19)  SpO2: 97% (03-19-21 @ 08:24) (92% - 98%)    Appearance: No acute distress, well developed  Eyes: normal appearing conjunctiva, pupils and eyelids  Cardiovascular: Normal S1 S2, No JVD, No murmurs, No edema  Respiratory: Lungs clear to auscultation	bilaterally.  No wheeze, rhonchi, rales noted  Gastrointestinal:  Soft, NT/ND 	  Neurologic:  No deficit noted  Psych: A&Ox3, normal mood/affect  Musculoskeletal: normal gait  Skin: no rash noted, normal color and pigmentation.        LABS:                        13.0   4.83  )-----------( 175      ( 19 Mar 2021 07:26 )             39.6     142  |  106  |  14  ----------------------------<  178<H>  4.7   |  24  |  0.62    Ca    9.3      19 Mar 2021 07:25  Phos  4.3     03-19  Mg     1.8     03-19    TPro  6.7  /  Alb  3.6  /  TBili  0.3  /  DBili  x   /  AST  39  /  ALT  43  /  AlkPhos  85  03-19    TSH 0.501 uIU/mL (03.17.21 @ 08:41)   Troponin 0.01 (3/17), 0.01 (3/16)    EKG:   3/16 Normal sinus rhythm at 81 bpm with incomplete RBBB (she has a known RBBB) and LAFB; TWI in v1-v3, some TW flattening in III  3/18 Sinus bradycardia at rate of 49 bpm with an incomplete RBBB; Borderline LAFB, relatively unchanged  3/19 Sinus bradycardia at a rate of 47 bpm unchanged from previous    Telemetry: Not on telemetry; pulse ox reading a pulse of 57; manual pulse matches    ECHO:  TTE Limited Echo w/o Cont (03.17.21 @ 10:58)  LVEF is >75%.   1. Left ventricular hypertrophy present. Limited views obtained with no obvious wall motion abnormalities and hyperdynamic LV systolic function overall. LVEF is >75%.   2. The right ventricle is not well visualized, but appears to be grossly normal in structure and function.   3. Spectral and color Doppler was of the cardiac valves was not obtained.   4. No pericardial effusion.    Echocardiogram (01/29/2021) showed moderate concentric LV hypertrophy w/ normal global wall motion and EF 65%, grade I diastolic dysfunction, RV normal size and systolic function, moderate AR, mild AS, mild MR w/ moderate calcification of mitral valve annulus, mild TR, trace OH, and no evidence of pericardial effusion.    NST (02/15/2021) showed mild anteroapical and anteroseptal ischemia w/ normal biventricular size and function.    Prior EP procedures: none    Assessment Plan:  Pt is a 69 y/o female, former smoker and 9/11 environmental exposure, w/ pulm fibrosis, ischemic heart disease, HTN and VINCE (does not use CPAP) and known RBBB presented on 3/16 w/ c/o of progressively worsening SOB x 5 days with productive cough admitted to [address] [place] [address] for resp insufficiency req'ing NC 1-2L, now improved. EP consulted for bradycardia to 40s and lightheadedness x1 when getting up to go to bathroom; lightheadedness is likely orthostatic based on pt history and clinical data +/- ischemic heart disease.  - needs orthostatics  - oral hydration  - will need an ischemic work up once covid neg  - should follow up with pulm and cardiology after discharge

## 2021-03-19 NOTE — PROGRESS NOTE ADULT - ATTENDING COMMENTS
69 y/o female, former smoker, HTN and VINCE (does not use CPAP) , recent abnormal stress test presented  with cough and shortness of breath and admitted for sepsis and AHRF due to covid and Rx with Remdesevir and steroids and is better. EkG was unremarkable and trop was neg and echo  shows LVH. Pt denies any chest pain/shortness of breath today and is better and medically cleared for d/c  Assesment  AHRF due to covid  + stress test  CAD  former smoker  HTN   VINCE (does not use CPAP)     Plan  Pt received remdesevir  discussed about starting statin but pt stated that she wasn't on statin due to fatty liver  Pt is medicaly cleared for d/c with home o2 and f/u with cardiologist.
69 y/o female, former smoker, HTN and VINCE (does not use CPAP) , recent abnormal stress test presented  with cough and shortness of breath and admitted for sepsis and AHRF due to covid and Rx with Remdesevir and steroids and is better. EkG was unremarkable and trop was neg and echo  shows LVH. Pt denies any chest pain/shortness of breath today and is better.   Assesment  AHRF due to covid  + stress test  CAD  former smoker  HTN   VINCE (does not use CPAP)     Plan  Pt received remdesevir  discussed about starting statin but pt stated that she wasn't on statin due to fatty liver  Pt is medicaly cleared for d/c with home o2 and f/u with cardiologist
71 y/o female, former smoker, HTN and VINCE (does not use CPAP) , recent abnormal stress test presented  with cough and shortness of breath and admitted for sepsis and AHRF due to covid and Rx with Remdesevir and steroids and is better. EkG was unremarkable and trop was neg and echo is pending. Pt denies any chest pain/shortness of breath today and is better    Assesment  AHRF due to covid  + stress test  former smoker  HTN   VINCE (does not use CPAP)     Plan  Pt received remdesevir  discussed about starting statin but pt stated that she wasn't on statin due to fatty liver  echo and cardio consult as needed  Monitor covid inflammatory markers and hypoxia and consider icu as needed  Discharge planning with home oxygen

## 2021-03-19 NOTE — DISCHARGE NOTE NURSING/CASE MANAGEMENT/SOCIAL WORK - NSDCFUADDAPPT_GEN_ALL_CORE_FT
Please call [phone] to schedule a follow-up appointment with your outpatient Cardiologist, Dr. Amara Esposito, within 1 week after you leave the hospital.    Please call [phone] to schedule a follow-up appointment with your outpatient Primary Care Physician, Dr. Sharan Park, within 1-2 weeks after you leave the hospital.

## 2021-03-20 RX ORDER — DEXAMETHASONE 0.5 MG/5ML
1 ELIXIR ORAL
Qty: 6 | Refills: 0
Start: 2021-03-20 | End: 2021-03-25

## 2021-03-22 LAB
CULTURE RESULTS: SIGNIFICANT CHANGE UP
CULTURE RESULTS: SIGNIFICANT CHANGE UP
SPECIMEN SOURCE: SIGNIFICANT CHANGE UP
SPECIMEN SOURCE: SIGNIFICANT CHANGE UP

## 2021-03-26 DIAGNOSIS — J84.10 PULMONARY FIBROSIS, UNSPECIFIED: ICD-10-CM

## 2021-03-26 DIAGNOSIS — I45.10 UNSPECIFIED RIGHT BUNDLE-BRANCH BLOCK: ICD-10-CM

## 2021-03-26 DIAGNOSIS — U07.1 COVID-19: ICD-10-CM

## 2021-03-26 DIAGNOSIS — G47.33 OBSTRUCTIVE SLEEP APNEA (ADULT) (PEDIATRIC): ICD-10-CM

## 2021-03-26 DIAGNOSIS — I11.0 HYPERTENSIVE HEART DISEASE WITH HEART FAILURE: ICD-10-CM

## 2021-03-26 DIAGNOSIS — Z79.82 LONG TERM (CURRENT) USE OF ASPIRIN: ICD-10-CM

## 2021-03-26 DIAGNOSIS — J96.01 ACUTE RESPIRATORY FAILURE WITH HYPOXIA: ICD-10-CM

## 2021-03-26 DIAGNOSIS — A41.89 OTHER SPECIFIED SEPSIS: ICD-10-CM

## 2021-03-26 DIAGNOSIS — I08.0 RHEUMATIC DISORDERS OF BOTH MITRAL AND AORTIC VALVES: ICD-10-CM

## 2021-03-26 DIAGNOSIS — I25.9 CHRONIC ISCHEMIC HEART DISEASE, UNSPECIFIED: ICD-10-CM

## 2021-03-26 DIAGNOSIS — I50.30 UNSPECIFIED DIASTOLIC (CONGESTIVE) HEART FAILURE: ICD-10-CM

## 2021-03-26 DIAGNOSIS — Z87.891 PERSONAL HISTORY OF NICOTINE DEPENDENCE: ICD-10-CM

## 2021-03-26 DIAGNOSIS — Z79.899 OTHER LONG TERM (CURRENT) DRUG THERAPY: ICD-10-CM

## 2021-03-26 DIAGNOSIS — I08.1 RHEUMATIC DISORDERS OF BOTH MITRAL AND TRICUSPID VALVES: ICD-10-CM

## 2021-03-26 DIAGNOSIS — J12.82 PNEUMONIA DUE TO CORONAVIRUS DISEASE 2019: ICD-10-CM

## 2021-04-09 ENCOUNTER — INPATIENT (INPATIENT)
Facility: HOSPITAL | Age: 71
LOS: 3 days | Discharge: HOME HEALTH | DRG: 871 | End: 2021-04-13
Attending: FAMILY MEDICINE | Admitting: HOSPITALIST
Payer: MEDICARE

## 2021-04-09 VITALS
SYSTOLIC BLOOD PRESSURE: 157 MMHG | HEIGHT: 63 IN | DIASTOLIC BLOOD PRESSURE: 90 MMHG | OXYGEN SATURATION: 83 % | RESPIRATION RATE: 25 BRPM | TEMPERATURE: 98 F | HEART RATE: 93 BPM | WEIGHT: 160.06 LBS

## 2021-04-09 DIAGNOSIS — J15.9 UNSPECIFIED BACTERIAL PNEUMONIA: ICD-10-CM

## 2021-04-09 DIAGNOSIS — I25.9 CHRONIC ISCHEMIC HEART DISEASE, UNSPECIFIED: ICD-10-CM

## 2021-04-09 DIAGNOSIS — J84.10 PULMONARY FIBROSIS, UNSPECIFIED: ICD-10-CM

## 2021-04-09 DIAGNOSIS — I10 ESSENTIAL (PRIMARY) HYPERTENSION: ICD-10-CM

## 2021-04-09 DIAGNOSIS — R63.8 OTHER SYMPTOMS AND SIGNS CONCERNING FOOD AND FLUID INTAKE: ICD-10-CM

## 2021-04-09 DIAGNOSIS — U07.1 COVID-19: ICD-10-CM

## 2021-04-09 DIAGNOSIS — I50.32 CHRONIC DIASTOLIC (CONGESTIVE) HEART FAILURE: ICD-10-CM

## 2021-04-09 DIAGNOSIS — G47.33 OBSTRUCTIVE SLEEP APNEA (ADULT) (PEDIATRIC): ICD-10-CM

## 2021-04-09 DIAGNOSIS — A41.9 SEPSIS, UNSPECIFIED ORGANISM: ICD-10-CM

## 2021-04-09 LAB
ALBUMIN SERPL ELPH-MCNC: 3.8 G/DL — SIGNIFICANT CHANGE UP (ref 3.3–5)
ALP SERPL-CCNC: 99 U/L — SIGNIFICANT CHANGE UP (ref 40–120)
ALT FLD-CCNC: 70 U/L — HIGH (ref 10–45)
ANION GAP SERPL CALC-SCNC: 11 MMOL/L — SIGNIFICANT CHANGE UP (ref 5–17)
APPEARANCE UR: CLEAR — SIGNIFICANT CHANGE UP
APTT BLD: 32 SEC — SIGNIFICANT CHANGE UP (ref 27.5–35.5)
AST SERPL-CCNC: 55 U/L — HIGH (ref 10–40)
BACTERIA # UR AUTO: PRESENT /HPF
BASE EXCESS BLDV CALC-SCNC: -1.7 MMOL/L — SIGNIFICANT CHANGE UP
BASOPHILS # BLD AUTO: 0.03 K/UL — SIGNIFICANT CHANGE UP (ref 0–0.2)
BASOPHILS NFR BLD AUTO: 0.2 % — SIGNIFICANT CHANGE UP (ref 0–2)
BILIRUB SERPL-MCNC: 0.6 MG/DL — SIGNIFICANT CHANGE UP (ref 0.2–1.2)
BILIRUB UR-MCNC: NEGATIVE — SIGNIFICANT CHANGE UP
BUN SERPL-MCNC: 16 MG/DL — SIGNIFICANT CHANGE UP (ref 7–23)
CA-I SERPL-SCNC: 1.2 MMOL/L — SIGNIFICANT CHANGE UP (ref 1.12–1.3)
CALCIUM SERPL-MCNC: 9.3 MG/DL — SIGNIFICANT CHANGE UP (ref 8.4–10.5)
CHLORIDE SERPL-SCNC: 103 MMOL/L — SIGNIFICANT CHANGE UP (ref 96–108)
CK SERPL-CCNC: 60 U/L — SIGNIFICANT CHANGE UP (ref 25–170)
CO2 SERPL-SCNC: 23 MMOL/L — SIGNIFICANT CHANGE UP (ref 22–31)
COLOR SPEC: YELLOW — SIGNIFICANT CHANGE UP
COMMENT - URINE: SIGNIFICANT CHANGE UP
COMMENT - URINE: SIGNIFICANT CHANGE UP
CREAT SERPL-MCNC: 0.67 MG/DL — SIGNIFICANT CHANGE UP (ref 0.5–1.3)
CRP SERPL-MCNC: 4.8 MG/L — HIGH (ref 0–4)
D DIMER BLD IA.RAPID-MCNC: 344 NG/ML DDU — HIGH
DIFF PNL FLD: NEGATIVE — SIGNIFICANT CHANGE UP
EOSINOPHIL # BLD AUTO: 0.01 K/UL — SIGNIFICANT CHANGE UP (ref 0–0.5)
EOSINOPHIL NFR BLD AUTO: 0.1 % — SIGNIFICANT CHANGE UP (ref 0–6)
EPI CELLS # UR: ABNORMAL /HPF (ref 0–5)
FERRITIN SERPL-MCNC: 687 NG/ML — HIGH (ref 15–150)
FIBRINOGEN PPP-MCNC: 386 MG/DL — SIGNIFICANT CHANGE UP (ref 258–438)
GAS PNL BLDV: 138 MMOL/L — SIGNIFICANT CHANGE UP (ref 138–146)
GAS PNL BLDV: SIGNIFICANT CHANGE UP
GLUCOSE SERPL-MCNC: 128 MG/DL — HIGH (ref 70–99)
GLUCOSE UR QL: NEGATIVE — SIGNIFICANT CHANGE UP
HCO3 BLDV-SCNC: 23 MMOL/L — SIGNIFICANT CHANGE UP (ref 20–27)
HCT VFR BLD CALC: 42.5 % — SIGNIFICANT CHANGE UP (ref 34.5–45)
HGB BLD-MCNC: 13.8 G/DL — SIGNIFICANT CHANGE UP (ref 11.5–15.5)
IMM GRANULOCYTES NFR BLD AUTO: 0.7 % — SIGNIFICANT CHANGE UP (ref 0–1.5)
INR BLD: 1.11 — SIGNIFICANT CHANGE UP (ref 0.88–1.16)
KETONES UR-MCNC: NEGATIVE — SIGNIFICANT CHANGE UP
LACTATE SERPL-SCNC: 2.9 MMOL/L — HIGH (ref 0.5–2)
LACTATE SERPL-SCNC: 4.4 MMOL/L — CRITICAL HIGH (ref 0.5–2)
LACTATE SERPL-SCNC: 5.8 MMOL/L — CRITICAL HIGH (ref 0.5–2)
LDH SERPL L TO P-CCNC: 398 U/L — HIGH (ref 50–242)
LEUKOCYTE ESTERASE UR-ACNC: ABNORMAL
LYMPHOCYTES # BLD AUTO: 1.76 K/UL — SIGNIFICANT CHANGE UP (ref 1–3.3)
LYMPHOCYTES # BLD AUTO: 14.4 % — SIGNIFICANT CHANGE UP (ref 13–44)
MCHC RBC-ENTMCNC: 32.5 GM/DL — SIGNIFICANT CHANGE UP (ref 32–36)
MCHC RBC-ENTMCNC: 33 PG — SIGNIFICANT CHANGE UP (ref 27–34)
MCV RBC AUTO: 101.7 FL — HIGH (ref 80–100)
MONOCYTES # BLD AUTO: 0.45 K/UL — SIGNIFICANT CHANGE UP (ref 0–0.9)
MONOCYTES NFR BLD AUTO: 3.7 % — SIGNIFICANT CHANGE UP (ref 2–14)
NEUTROPHILS # BLD AUTO: 9.85 K/UL — HIGH (ref 1.8–7.4)
NEUTROPHILS NFR BLD AUTO: 80.9 % — HIGH (ref 43–77)
NITRITE UR-MCNC: NEGATIVE — SIGNIFICANT CHANGE UP
NRBC # BLD: 0 /100 WBCS — SIGNIFICANT CHANGE UP (ref 0–0)
NT-PROBNP SERPL-SCNC: 314 PG/ML — HIGH (ref 0–300)
PCO2 BLDV: 41 MMHG — SIGNIFICANT CHANGE UP (ref 39–42)
PH BLDV: 7.37 — SIGNIFICANT CHANGE UP (ref 7.32–7.43)
PH UR: 6 — SIGNIFICANT CHANGE UP (ref 5–8)
PLATELET # BLD AUTO: 239 K/UL — SIGNIFICANT CHANGE UP (ref 150–400)
PO2 BLDV: 37 MMHG — SIGNIFICANT CHANGE UP
POTASSIUM BLDV-SCNC: 4.4 MMOL/L — SIGNIFICANT CHANGE UP (ref 3.5–4.9)
POTASSIUM SERPL-MCNC: 4.5 MMOL/L — SIGNIFICANT CHANGE UP (ref 3.5–5.3)
POTASSIUM SERPL-SCNC: 4.5 MMOL/L — SIGNIFICANT CHANGE UP (ref 3.5–5.3)
PROCALCITONIN SERPL-MCNC: 0.17 NG/ML — HIGH (ref 0.02–0.1)
PROT SERPL-MCNC: 7.7 G/DL — SIGNIFICANT CHANGE UP (ref 6–8.3)
PROT UR-MCNC: NEGATIVE MG/DL — SIGNIFICANT CHANGE UP
PROTHROM AB SERPL-ACNC: 13.3 SEC — SIGNIFICANT CHANGE UP (ref 10.6–13.6)
RBC # BLD: 4.18 M/UL — SIGNIFICANT CHANGE UP (ref 3.8–5.2)
RBC # FLD: 12.9 % — SIGNIFICANT CHANGE UP (ref 10.3–14.5)
RBC CASTS # UR COMP ASSIST: < 5 /HPF — SIGNIFICANT CHANGE UP
SAO2 % BLDV: 67 % — SIGNIFICANT CHANGE UP
SARS-COV-2 RNA SPEC QL NAA+PROBE: DETECTED
SODIUM SERPL-SCNC: 137 MMOL/L — SIGNIFICANT CHANGE UP (ref 135–145)
SP GR SPEC: 1.02 — SIGNIFICANT CHANGE UP (ref 1–1.03)
TROPONIN T SERPL-MCNC: <0.01 NG/ML — SIGNIFICANT CHANGE UP (ref 0–0.01)
UROBILINOGEN FLD QL: 0.2 E.U./DL — SIGNIFICANT CHANGE UP
WBC # BLD: 12.18 K/UL — HIGH (ref 3.8–10.5)
WBC # FLD AUTO: 12.18 K/UL — HIGH (ref 3.8–10.5)
WBC UR QL: ABNORMAL /HPF

## 2021-04-09 PROCEDURE — 99221 1ST HOSP IP/OBS SF/LOW 40: CPT | Mod: CS,GC

## 2021-04-09 PROCEDURE — 71045 X-RAY EXAM CHEST 1 VIEW: CPT | Mod: 26,77

## 2021-04-09 PROCEDURE — 93010 ELECTROCARDIOGRAM REPORT: CPT

## 2021-04-09 PROCEDURE — 99285 EMERGENCY DEPT VISIT HI MDM: CPT | Mod: CS,25

## 2021-04-09 PROCEDURE — 71275 CT ANGIOGRAPHY CHEST: CPT | Mod: 26,MA

## 2021-04-09 PROCEDURE — 71045 X-RAY EXAM CHEST 1 VIEW: CPT | Mod: 26

## 2021-04-09 RX ORDER — ACETAMINOPHEN 500 MG
1000 TABLET ORAL ONCE
Refills: 0 | Status: COMPLETED | OUTPATIENT
Start: 2021-04-09 | End: 2021-04-09

## 2021-04-09 RX ORDER — ASPIRIN/CALCIUM CARB/MAGNESIUM 324 MG
81 TABLET ORAL DAILY
Refills: 0 | Status: DISCONTINUED | OUTPATIENT
Start: 2021-04-10 | End: 2021-04-13

## 2021-04-09 RX ORDER — ENOXAPARIN SODIUM 100 MG/ML
40 INJECTION SUBCUTANEOUS EVERY 24 HOURS
Refills: 0 | Status: DISCONTINUED | OUTPATIENT
Start: 2021-04-10 | End: 2021-04-13

## 2021-04-09 RX ORDER — DEXAMETHASONE 0.5 MG/5ML
6 ELIXIR ORAL ONCE
Refills: 0 | Status: COMPLETED | OUTPATIENT
Start: 2021-04-09 | End: 2021-04-09

## 2021-04-09 RX ORDER — IPRATROPIUM/ALBUTEROL SULFATE 18-103MCG
3 AEROSOL WITH ADAPTER (GRAM) INHALATION ONCE
Refills: 0 | Status: COMPLETED | OUTPATIENT
Start: 2021-04-09 | End: 2021-04-09

## 2021-04-09 RX ORDER — SODIUM CHLORIDE 9 MG/ML
1000 INJECTION INTRAMUSCULAR; INTRAVENOUS; SUBCUTANEOUS ONCE
Refills: 0 | Status: COMPLETED | OUTPATIENT
Start: 2021-04-09 | End: 2021-04-09

## 2021-04-09 RX ORDER — VANCOMYCIN HCL 1 G
1000 VIAL (EA) INTRAVENOUS ONCE
Refills: 0 | Status: COMPLETED | OUTPATIENT
Start: 2021-04-09 | End: 2021-04-09

## 2021-04-09 RX ORDER — PIPERACILLIN AND TAZOBACTAM 4; .5 G/20ML; G/20ML
3.38 INJECTION, POWDER, LYOPHILIZED, FOR SOLUTION INTRAVENOUS ONCE
Refills: 0 | Status: COMPLETED | OUTPATIENT
Start: 2021-04-09 | End: 2021-04-09

## 2021-04-09 RX ADMIN — PIPERACILLIN AND TAZOBACTAM 200 GRAM(S): 4; .5 INJECTION, POWDER, LYOPHILIZED, FOR SOLUTION INTRAVENOUS at 18:15

## 2021-04-09 RX ADMIN — Medication 3 MILLILITER(S): at 17:26

## 2021-04-09 RX ADMIN — Medication 1000 MILLIGRAM(S): at 18:38

## 2021-04-09 RX ADMIN — Medication 100 MILLIGRAM(S): at 18:57

## 2021-04-09 RX ADMIN — SODIUM CHLORIDE 1000 MILLILITER(S): 9 INJECTION INTRAMUSCULAR; INTRAVENOUS; SUBCUTANEOUS at 18:00

## 2021-04-09 RX ADMIN — Medication 6 MILLIGRAM(S): at 17:00

## 2021-04-09 RX ADMIN — Medication 250 MILLIGRAM(S): at 18:00

## 2021-04-09 NOTE — H&P ADULT - ATTENDING COMMENTS
[ ] Severe Sepsis 2/2 possible super-imposed pneumonia   -Met criteria via temp, tachycardia, leukocytosis   -Lactate 2.9 -> 5.8 – elevation also likely d/t a degree of work of breathing as patient WWP on exam.   -No clear consolidation on CTA, reporting dry cough, but an increase in degree of cough.   -Procal 0.17  -Given recent hospitalization and e/o bronchiectasis on CT – will c/w Vanc + Zosyn.   -Obtain MRSA swab    [ ] Lactic Acidosis   -Lactate persistently elevated – given 1L NS in ER – Lactate eleanor, persists to be > 3  -Will give addl 500cc bolus   -Trend in AM   -No e/o hypoperfusion     [ ] Acute Hypoxic Respiratory Failure   -Patient w/ recent COVID diagnosis and treatment – sent home on Home O2.   -83% on RA on arrival (O2 ran out in Ambulance). 99%on 2L NC.   -Unclear if this continues to be progression of known fibrotic disease, worsened by COVID and now possible pneumonia.   -Utility of steroids? – patient has already received 10 days Decadron and additional prednisone taper.   -Would Consult Pulmonology.     [ ] Known COVID-19 Infection  -Already s/p treatment – no indication for novel therapy.   -Unclear utility of steroids given s/p 2 recent courses of steroids.     [ ] H/o CAD – req. elective cardiac cath    -Reporting pleuritic type, not exertional chest pain.   -Trop neg  -EKG unchanged   -Cardiology F/u in outpatient setting.

## 2021-04-09 NOTE — H&P ADULT - NSHPLABSRESULTS_GEN_ALL_CORE
LABS:                         13.8   12.18 )-----------( 239      ( 2021 17:35 )             42.5         137  |  103  |  16  ----------------------------<  128<H>  4.5   |  23  |  0.67    Ca    9.3      2021 17:35    TPro  7.7  /  Alb  3.8  /  TBili  0.6  /  DBili  x   /  AST  55<H>  /  ALT  70<H>  /  AlkPhos  99      PT/INR - ( 2021 17:35 )   PT: 13.3 sec;   INR: 1.11          PTT - ( 2021 17:35 )  PTT:32.0 sec  Urinalysis Basic - ( 2021 20:55 )    Color: Yellow / Appearance: Clear / S.020 / pH: x  Gluc: x / Ketone: NEGATIVE  / Bili: Negative / Urobili: 0.2 E.U./dL   Blood: x / Protein: NEGATIVE mg/dL / Nitrite: NEGATIVE   Leuk Esterase: Small / RBC: < 5 /HPF / WBC 5-10 /HPF   Sq Epi: x / Non Sq Epi: 5-10 /HPF / Bacteria: Present /HPF      CARDIAC MARKERS ( 2021 17:35 )  x     / <0.01 ng/mL / 60 U/L / x     / x          Serum Pro-Brain Natriuretic Peptide: 314 pg/mL ( @ 17:35)    Lactate, Blood: 4.4 mmol/L ( @ 21:35)  Lactate, Blood: 5.8 mmol/L ( @ 20:45)  Lactate, Blood: 2.9 mmol/L ( @ 17:35)      RADIOLOGY, EKG & ADDITIONAL TESTS: Reviewed.

## 2021-04-09 NOTE — ED CLERICAL - NS ED CLERK NOTE PRE-ARRIVAL INFORMATION; ADDITIONAL PRE-ARRIVAL INFORMATION
was admitted 4 wks ago + covid pneumonia; has hemoptysis. PT is wheezing, lungs sound brachy. harsh breath, a-fib. DR. Byrd ( pulm)

## 2021-04-09 NOTE — H&P ADULT - NSHPSOCIALHISTORY_GEN_ALL_CORE
Currently staying at niece's place due to covid quarantine, otherwise lives with sister. Remote history of tobacco use. No assistive devices.

## 2021-04-09 NOTE — H&P ADULT - NSHPPHYSICALEXAM_GEN_ALL_CORE
PHYSICAL EXAM:    Constitutional: WDWN, lying comfortably in bed, NAD  Head: Nc/At  Eyes: PERRL, EOMI, clear conjunctiva  ENT: no nasal discharge; uvula midline, no oropharyngeal erythema or exudates; MMM  Neck: supple; no JVD or thyromegaly  Respiratory: CTA b/l, no wheezes, rales, or rhonchi  Cardiac: +S1/S2, +RRR, no murmurs, rubs, or gallops  Gastrointestinal: soft, non-tender, non-distended, no rebound/guarding, no palpable masses, normoactive bowel sounds x4  Back: spine midline, no bony tenderness or step-offs; no CVAT B/L  Extremities: WWP, no clubbing or cyanosis, no peripheral edema  Musculoskeletal: NROM x4; no joint swelling, tenderness or erythema  Vascular: 2+ radial and DP pulses b/l  Dermatologic: skin warm, dry and intact, no rashes, wounds, or scars  Lymphatic: no submandibular or cervical LAD  Neurologic: AAOx3, CNII-XII grossly intact, no focal deficits  Psychiatric: affect and characteristics of appearance, verbalizations, behaviors are appropriate PHYSICAL EXAM:    Constitutional: WDWN, standing at bedside, significant coughing with talking  Head: Nc/At  Eyes: PERRL, EOMI, clear conjunctiva  ENT: no nasal discharge; uvula midline, no oropharyngeal erythema or exudates; MMM  Neck: supple; no JVD or thyromegaly  Respiratory: poor air movement, good inspiratory effort, mild rhonchi at bases, no noted rales/wheezing  Cardiac: RRR, S1/S2, +3/6 murmur at LUSB  Gastrointestinal: soft, non-tender, non-distended, no rebound/guarding, no palpable masses, normoactive bowel sounds x4  Back: spine midline, no bony tenderness or step-offs; no CVAT B/L  Extremities: WWP, no clubbing or cyanosis, no peripheral edema  Musculoskeletal: NROM x4; no joint swelling, tenderness or erythema  Vascular: 2+ radial and DP pulses b/l  Dermatologic: skin warm, dry and intact, no rashes, wounds, or scars  Lymphatic: no submandibular or cervical LAD  Neurologic: AAOx3, CNII-XII grossly intact, no focal deficits  Psychiatric: affect and characteristics of appearance, verbalizations, behaviors are appropriate

## 2021-04-09 NOTE — H&P ADULT - PROBLEM SELECTOR PLAN 1
pt meeting criteria for WBC, tachypnea, pt meeting 4/4 SIRS criteria, also with lactate, mild LFT elevation - although lactate also possibly elevated from significant tachypnea on arrival  30 cc/kg = 2200cc, pt received 1L and tachycardia improved - will hold off on further fluids given hx diastolic HF  suspect superimposed bacterial pna, care as below pt meeting 4/4 SIRS criteria, also with lactate, mild LFT elevation - although lactate also possibly elevated from significant tachypnea on arrival  30 cc/kg = 2200cc, pt received 1L and tachycardia improved - will hold off on further fluids given hx diastolic HF  f/u bcx  suspect superimposed bacterial pna, care as below

## 2021-04-09 NOTE — ED ADULT NURSE REASSESSMENT NOTE - NS ED NURSE REASSESS COMMENT FT1
Received patient in recliner. AOX4, verbalized SI. VSS.  Patient denies chest pain, pain, discomfort, shortness of breath, difficulty breathing and any form of distress not noted. Patient oriented to ED area. Plan of care discussed and verbalized understanding. All needs attended. Hourly rounding in progress. Safety precautions maintained.
Received patient in stretcher. AOX4. VSS.  Patient w complaints of dyspnea on exertion. O2 maintained at 2LPM. Patient oriented to ED area. Plan of care discussed and verbalized understanding. All needs attended. Pt on cardiac monitor. Hourly rounding in progress. Patient left for CT scan in a stable condition. Assessment ongoing.

## 2021-04-09 NOTE — H&P ADULT - NSICDXPASTMEDICALHX_GEN_ALL_CORE_FT
PAST MEDICAL HISTORY:  Chronic diastolic congestive heart failure     Hypertension     Ischemic heart disease     Obstructive sleep apnea     Pneumonia due to COVID-19 virus     Pulmonary fibrosis

## 2021-04-09 NOTE — H&P ADULT - PROBLEM SELECTOR PLAN 2
no obvious focal finding on imaging however worsening cough, fever, mildly elevated procal  patient already completed course of azithromycin ending 4/8 so will treat with ceftriaxone only 1g IV x 5 days  patient recently hospitalized but did not receive IV abx, no hx MRSA, would NOT meet criteria for HAP, no definitive risk factors for MRSA or pseudomonas and therefore will continue with ceftriaxone  if patient were to become HD unstable would broaden abx  trend procal no obvious focal finding on imaging however worsening cough, fever, mildly elevated procal  with risk factors for mrsa/psuedomonas given recent hospitalization and bronchiectasis ---> will continue with vanc/zosyn for CAP w/ risk factors [not hap]  trend procal

## 2021-04-09 NOTE — ED ADULT NURSE NOTE - OBJECTIVE STATEMENT
C/o worsening shortness of breath, intermittent midsternal chest pressure worst with inspiration, spo2 83% on room air.  Spo2 99% on 2L/min via NC on arrival to unit.  Speaking clearly and coherently in full sentences sitting up in stretcher.  Nebs given as ordered.  Patient verbalized her breathing is much better s/p medications.  Hx covid + 1 month ago. Pending lab results.  VSS.  Cardiac / spo 2 monitoring / supplemental o2 via nc maintained.  Continue to monitor

## 2021-04-09 NOTE — H&P ADULT - PROBLEM SELECTOR PLAN 9
F: s/p 1L  N: dash/tlc  DVT: lovenox  PPI: start if steroids initiated  FULL CODE  DISPO: COVID REGIONAL

## 2021-04-09 NOTE — ED ADULT NURSE NOTE - NEURO MENTATION
Patient visited in room during nursing rounds. Patient alert and oriented x3. Ambulates with 
standby assist using walker prn and when using bathroom prn. Patient denies any discomfort 
or pain at this time. Patient noted to spit salivy mixed with blood due to throat CA. On IVF 
(NS at 100ml/hr). Call bell within reach. Will monitor closely. normal

## 2021-04-09 NOTE — H&P ADULT - ASSESSMENT
70F former smoker, with PMH HTN, VINCE, pulmonary fibrosis, HFpEF, ischemic heart disease, recent admission for COVID-19 discharged on O2 who re-presents due to worsening cough, hemoptysis, pleuritic chest pain possibly due to bacterial infection versus worsening of pulmonary fibrosis due to recent covid.

## 2021-04-09 NOTE — H&P ADULT - PROBLEM SELECTOR PLAN 3
s/p outpatient prednisone taper for 9 days finishing on 4/8  rec'd decadron in ER  likely exacerbated by covid and patient may have irreversible damage to lung from covid and may require oxygen long term  will defer decision on further steroids to pulmonology team

## 2021-04-09 NOTE — ED PROVIDER NOTE - CLINICAL SUMMARY MEDICAL DECISION MAKING FREE TEXT BOX
71 yo with worsening SOB / CP after COVID one month ago, consider PE, consider ACS , consider pna, consider reactive airway   CTA, L , cxr,   nebs / steroids empirically, ( lungs sound clear on exam however pt tachypneic and cough bronchitic sounding)   dispo pending workup / reeval

## 2021-04-09 NOTE — H&P ADULT - HISTORY OF PRESENT ILLNESS
71 y/o female, former smoker, w/ PMHx HTN, VINCE (does not use CPAP), pulmonary fibrosis, 9/11 exposure, stage I diastolic HfpEF (EF 75%) pending elective cardiac cath (deferred due to covid+) for chronic SOB and known ischemic heart disease on NST (Feb 2021), recent admission last month for COVID-19 (got 3 doses of remdesevir, discharged on home O2, decadron; course c/b sinus bradycardia felt by EP to be 2/2 covid) who re-presents due to persistent SOB and chest pain. Patient ran out of home O2 en route to hospital and therefore was 83% on arrival.     ED vitals: tmax 100.7, p 93, /90, RR 25, 83% on RA --> 97% on 2L  ED labs: WBC 12.8k, neut predom, D-dimer 344, lactate 2.9->5.8->4.4, AST 55/ALT 70, CRP 4.8, ferritin 687, , bnp 314; procal 0.17  VBG: pH 7.37, CO2 41, bicarb 23  UA: small leuk esterase, 5-10 wbc, 5-10 epithelial cells, +bacteria  COVID+  CT-PE: IMPRESSION:  No pulmonary embolism.  Emphysematous changes of the lungs with patchy groundglass peripherally, reticular changes of the lungs and bronchiectasis could be due to an interstitial lung disease and or atypical pneumonia such as Covid 19.  CXR: bilateral interstitial opacities    Pt received vanc 1g,  zosyn, decadron IV 6, 1L NS, duonebs x 3, tessalon        69 y/o female, former smoker, w/ PMHx HTN, VINCE (does not use CPAP), pulmonary fibrosis likely 2/2 9/11 exposure (not on home O2 pre-covid), stage I diastolic HfpEF (EF 75%) pending elective cardiac cath (deferred due to covid+) for chronic SOB and known ischemic heart disease on NST (Feb 2021), recent admission last month for COVID-19 (got 3 doses of remdesevir, discharged on home O2, decadron; course c/b sinus bradycardia felt by EP to be 2/2 covid) who re-presents due to persistent SOB, hemoptysis, and pleuritic pain. At home she was using 2-3L at rest and 4-5 L with exercise (stable usage since discharge). Patient notes that for the past couple days she has been coughing up some small amounts of blood clot in the AM only. Otherwise her cough is dry. Per her outpatient providers, she completed a z-ximena yesterday as well as a 9 day prednisone taper (30mg x 3, 20mg x 3, 10mg x 3). Noticed some improvement while on the prednisone but now feels worse again. Denies fevers, chills, headache, nausea, vomiting, abd pain, diarrhea, constipation, leg pain/swelling. Does have chest pain but only with deep breathing.     ED vitals: tmax 100.7, p 93, /90, RR 25, 83% on RA --> 97% on 2L  ED labs: WBC 12.8k, neut predom, D-dimer 344, lactate 2.9->5.8->4.4, AST 55/ALT 70, CRP 4.8, ferritin 687, , bnp 314; procal 0.17  VBG: pH 7.37, CO2 41, bicarb 23  UA: small leuk esterase, 5-10 wbc, 5-10 epithelial cells, +bacteria  COVID+  CT-PE: IMPRESSION:  No pulmonary embolism.  Emphysematous changes of the lungs with patchy groundglass peripherally, reticular changes of the lungs and bronchiectasis could be due to an interstitial lung disease and or atypical pneumonia such as Covid 19.  CXR: bilateral interstitial opacities    Pt received vanc 1g,  zosyn, decadron IV 6, 1L NS, duonebs x 3, tessalon        71 y/o female, former smoker, w/ PMHx HTN, VINCE (does not use CPAP), pulmonary fibrosis likely 2/2 9/11 exposure (not on home O2 pre-covid), stage I diastolic HfpEF (EF 75%) pending elective cardiac cath (deferred due to covid+) for chronic SOB and known ischemic heart disease on NST (Feb 2021), recent admission last month for COVID-19 (got 3 doses of remdesevir, discharged on home O2, decadron; course c/b sinus bradycardia felt by EP to be 2/2 covid) who re-presents due to persistent SOB, hemoptysis, and pleuritic pain. At home she was using 2-3L at rest and 4-5 L with exercise (stable usage since discharge). Patient notes that for the past couple days she has been coughing up some small amounts of blood clot in the AM only. Otherwise her cough is dry. Per her outpatient providers, she completed a z-ximena yesterday as well as a 9 day prednisone taper (30mg x 3, 20mg x 3, 10mg x 3). Noticed some improvement while on the prednisone but now feels worse again. Denies fevers, chills, headache, nausea, vomiting, abd pain, diarrhea, constipation, leg pain/swelling. Does have chest pain but only with deep breathing.     ED vitals: tmax 100.7, p 93, /90, RR 25, 83% on RA --> 97% on 2L  ED labs: WBC 12.8k, neut predom, D-dimer 344, lactate 2.9->5.8->4.4, AST 55/ALT 70, CRP 4.8, ferritin 687, , bnp 314; procal 0.17  VBG: pH 7.37, CO2 41, bicarb 23  EKG: NSR at 80, incomplete RBBB, LAFB; TWI V1-V3 (present last admission)  UA: small leuk esterase, 5-10 wbc, 5-10 epithelial cells, +bacteria  COVID+  CT-PE: IMPRESSION:  No pulmonary embolism.  Emphysematous changes of the lungs with patchy groundglass peripherally, reticular changes of the lungs and bronchiectasis could be due to an interstitial lung disease and or atypical pneumonia such as Covid 19.  CXR: bilateral interstitial opacities    Pt received vanc 1g,  zosyn, decadron IV 6, 1L NS, duonebs x 3, tessalon

## 2021-04-09 NOTE — H&P ADULT - PROBLEM SELECTOR PLAN 6
pending cath once stable/ covid neg  chest pain is pleuritic in nature, EKG unchanged from prior, trops neg  low suspicion presentation is due to ACS  c/w toprol 25  per last discharge, not on statin due to fatty liver

## 2021-04-09 NOTE — ED PROVIDER NOTE - OBJECTIVE STATEMENT
71 yo with SOB and chest pain  Covid 1 month ago and chronic lung scarring of unclear cause , HTN , VINCE   persistent SOB and substernal CP since COVID , reports CP is substernal and worse with exertion, in past had planned on Catherization however had pos COVID test on precath workup and has not yet had this cardiac workup. no ho of PE or DVT , came to ER today as SOB worsened, hypoxic to 83 % on arrival to the ER>   no n/v/d, no fevers, no urinary symptoms . 71 yo with SOB and chest pain  Covid 1 month ago and chronic lung scarring of unclear cause , HTN , VINCE   persistent SOB and substernal CP since COVID , reports CP is substernal and worse with exertion w no worsening in CP,, in past had planned on Catherization however had pos COVID test on precath workup and has not yet had this cardiac workup. persistent cough and SOB, Oxygen ran out enroute and hypoxic to 83% on arrival,  no ho of PE or DVT , came to ER today as SOB worsened, hypoxic to 83 % on arrival to the ER>   no n/v/d, no fevers, no urinary symptoms .

## 2021-04-09 NOTE — ED ADULT TRIAGE NOTE - CHIEF COMPLAINT QUOTE
Patient reports was covid positive 1 month ago. "I was admitted for three days and was discharged with an oxygen tank". Patient reports "oxygen tank needs to be plugged in", complains of chest pain, SOB, observed o2 sat 83% RA. Patient upgraded to Dr. Mcintosh, taken to room 15.

## 2021-04-09 NOTE — ED ADULT NURSE NOTE - ED STAT RN HANDOFF DETAILS
Patient stable for transfer. Transfer and plan of care discussed with patient and verbalized understanding. No acute distress noted. Safety precautions maintained. Belongings secured with patient.

## 2021-04-09 NOTE — H&P ADULT - PROBLEM SELECTOR PLAN 4
initially tested + in mid march  s/p 3 doses remdesivir, 10 days decadron  patient does not meet criteria for further covid tx

## 2021-04-10 LAB
ALBUMIN SERPL ELPH-MCNC: 3.3 G/DL — SIGNIFICANT CHANGE UP (ref 3.3–5)
ALP SERPL-CCNC: 86 U/L — SIGNIFICANT CHANGE UP (ref 40–120)
ALT FLD-CCNC: 54 U/L — HIGH (ref 10–45)
ANION GAP SERPL CALC-SCNC: 11 MMOL/L — SIGNIFICANT CHANGE UP (ref 5–17)
AST SERPL-CCNC: 33 U/L — SIGNIFICANT CHANGE UP (ref 10–40)
BASOPHILS # BLD AUTO: 0 K/UL — SIGNIFICANT CHANGE UP (ref 0–0.2)
BASOPHILS NFR BLD AUTO: 0 % — SIGNIFICANT CHANGE UP (ref 0–2)
BILIRUB SERPL-MCNC: 0.6 MG/DL — SIGNIFICANT CHANGE UP (ref 0.2–1.2)
BUN SERPL-MCNC: 13 MG/DL — SIGNIFICANT CHANGE UP (ref 7–23)
CALCIUM SERPL-MCNC: 8.9 MG/DL — SIGNIFICANT CHANGE UP (ref 8.4–10.5)
CHLORIDE SERPL-SCNC: 106 MMOL/L — SIGNIFICANT CHANGE UP (ref 96–108)
CO2 SERPL-SCNC: 22 MMOL/L — SIGNIFICANT CHANGE UP (ref 22–31)
COVID-19 SPIKE DOMAIN AB INTERP: POSITIVE
COVID-19 SPIKE DOMAIN ANTIBODY RESULT: >250 U/ML — HIGH
CREAT SERPL-MCNC: 0.63 MG/DL — SIGNIFICANT CHANGE UP (ref 0.5–1.3)
CRP SERPL-MCNC: 5.4 MG/L — HIGH (ref 0–4)
D DIMER BLD IA.RAPID-MCNC: 261 NG/ML DDU — HIGH
EOSINOPHIL # BLD AUTO: 0 K/UL — SIGNIFICANT CHANGE UP (ref 0–0.5)
EOSINOPHIL NFR BLD AUTO: 0 % — SIGNIFICANT CHANGE UP (ref 0–6)
FERRITIN SERPL-MCNC: 544 NG/ML — HIGH (ref 15–150)
GLUCOSE BLDC GLUCOMTR-MCNC: 105 MG/DL — HIGH (ref 70–99)
GLUCOSE SERPL-MCNC: 156 MG/DL — HIGH (ref 70–99)
HCT VFR BLD CALC: 36.6 % — SIGNIFICANT CHANGE UP (ref 34.5–45)
HGB BLD-MCNC: 11.8 G/DL — SIGNIFICANT CHANGE UP (ref 11.5–15.5)
IMM GRANULOCYTES NFR BLD AUTO: 0.5 % — SIGNIFICANT CHANGE UP (ref 0–1.5)
LACTATE SERPL-SCNC: 2.9 MMOL/L — HIGH (ref 0.5–2)
LACTATE SERPL-SCNC: 3.5 MMOL/L — HIGH (ref 0.5–2)
LACTATE SERPL-SCNC: 3.9 MMOL/L — HIGH (ref 0.5–2)
LYMPHOCYTES # BLD AUTO: 1.84 K/UL — SIGNIFICANT CHANGE UP (ref 1–3.3)
LYMPHOCYTES # BLD AUTO: 16.7 % — SIGNIFICANT CHANGE UP (ref 13–44)
MAGNESIUM SERPL-MCNC: 1.7 MG/DL — SIGNIFICANT CHANGE UP (ref 1.6–2.6)
MCHC RBC-ENTMCNC: 32.2 GM/DL — SIGNIFICANT CHANGE UP (ref 32–36)
MCHC RBC-ENTMCNC: 33.7 PG — SIGNIFICANT CHANGE UP (ref 27–34)
MCV RBC AUTO: 104.6 FL — HIGH (ref 80–100)
MONOCYTES # BLD AUTO: 0.62 K/UL — SIGNIFICANT CHANGE UP (ref 0–0.9)
MONOCYTES NFR BLD AUTO: 5.6 % — SIGNIFICANT CHANGE UP (ref 2–14)
NEUTROPHILS # BLD AUTO: 8.51 K/UL — HIGH (ref 1.8–7.4)
NEUTROPHILS NFR BLD AUTO: 77.2 % — HIGH (ref 43–77)
NRBC # BLD: 0 /100 WBCS — SIGNIFICANT CHANGE UP (ref 0–0)
PHOSPHATE SERPL-MCNC: 2.8 MG/DL — SIGNIFICANT CHANGE UP (ref 2.5–4.5)
PLATELET # BLD AUTO: 210 K/UL — SIGNIFICANT CHANGE UP (ref 150–400)
POTASSIUM SERPL-MCNC: 4.6 MMOL/L — SIGNIFICANT CHANGE UP (ref 3.5–5.3)
POTASSIUM SERPL-SCNC: 4.6 MMOL/L — SIGNIFICANT CHANGE UP (ref 3.5–5.3)
PROCALCITONIN SERPL-MCNC: 0.12 NG/ML — HIGH (ref 0.02–0.1)
PROT SERPL-MCNC: 6.6 G/DL — SIGNIFICANT CHANGE UP (ref 6–8.3)
RBC # BLD: 3.5 M/UL — LOW (ref 3.8–5.2)
RBC # FLD: 12.8 % — SIGNIFICANT CHANGE UP (ref 10.3–14.5)
SARS-COV-2 IGG+IGM SERPL QL IA: >250 U/ML — HIGH
SARS-COV-2 IGG+IGM SERPL QL IA: POSITIVE
SODIUM SERPL-SCNC: 139 MMOL/L — SIGNIFICANT CHANGE UP (ref 135–145)
WBC # BLD: 11.03 K/UL — HIGH (ref 3.8–10.5)
WBC # FLD AUTO: 11.03 K/UL — HIGH (ref 3.8–10.5)

## 2021-04-10 PROCEDURE — 99232 SBSQ HOSP IP/OBS MODERATE 35: CPT | Mod: CS

## 2021-04-10 RX ORDER — SODIUM CHLORIDE 9 MG/ML
500 INJECTION INTRAMUSCULAR; INTRAVENOUS; SUBCUTANEOUS ONCE
Refills: 0 | Status: COMPLETED | OUTPATIENT
Start: 2021-04-10 | End: 2021-04-10

## 2021-04-10 RX ORDER — CEFTRIAXONE 500 MG/1
INJECTION, POWDER, FOR SOLUTION INTRAMUSCULAR; INTRAVENOUS
Refills: 0 | Status: DISCONTINUED | OUTPATIENT
Start: 2021-04-10 | End: 2021-04-10

## 2021-04-10 RX ORDER — VANCOMYCIN HCL 1 G
1000 VIAL (EA) INTRAVENOUS ONCE
Refills: 0 | Status: COMPLETED | OUTPATIENT
Start: 2021-04-11 | End: 2021-04-11

## 2021-04-10 RX ORDER — GUAIFENESIN/DEXTROMETHORPHAN 600MG-30MG
10 TABLET, EXTENDED RELEASE 12 HR ORAL EVERY 6 HOURS
Refills: 0 | Status: DISCONTINUED | OUTPATIENT
Start: 2021-04-10 | End: 2021-04-13

## 2021-04-10 RX ORDER — PIPERACILLIN AND TAZOBACTAM 4; .5 G/20ML; G/20ML
4.5 INJECTION, POWDER, LYOPHILIZED, FOR SOLUTION INTRAVENOUS EVERY 6 HOURS
Refills: 0 | Status: DISCONTINUED | OUTPATIENT
Start: 2021-04-10 | End: 2021-04-13

## 2021-04-10 RX ORDER — TIOTROPIUM BROMIDE 18 UG/1
1 CAPSULE ORAL; RESPIRATORY (INHALATION) DAILY
Refills: 0 | Status: DISCONTINUED | OUTPATIENT
Start: 2021-04-10 | End: 2021-04-13

## 2021-04-10 RX ORDER — PIPERACILLIN AND TAZOBACTAM 4; .5 G/20ML; G/20ML
4.5 INJECTION, POWDER, LYOPHILIZED, FOR SOLUTION INTRAVENOUS ONCE
Refills: 0 | Status: COMPLETED | OUTPATIENT
Start: 2021-04-10 | End: 2021-04-10

## 2021-04-10 RX ORDER — CEFTRIAXONE 500 MG/1
1000 INJECTION, POWDER, FOR SOLUTION INTRAMUSCULAR; INTRAVENOUS ONCE
Refills: 0 | Status: DISCONTINUED | OUTPATIENT
Start: 2021-04-10 | End: 2021-04-10

## 2021-04-10 RX ORDER — LANOLIN ALCOHOL/MO/W.PET/CERES
5 CREAM (GRAM) TOPICAL AT BEDTIME
Refills: 0 | Status: DISCONTINUED | OUTPATIENT
Start: 2021-04-10 | End: 2021-04-13

## 2021-04-10 RX ORDER — SODIUM CHLORIDE 9 MG/ML
1000 INJECTION, SOLUTION INTRAVENOUS
Refills: 0 | Status: DISCONTINUED | OUTPATIENT
Start: 2021-04-10 | End: 2021-04-12

## 2021-04-10 RX ORDER — MAGNESIUM SULFATE 500 MG/ML
2 VIAL (ML) INJECTION ONCE
Refills: 0 | Status: DISCONTINUED | OUTPATIENT
Start: 2021-04-10 | End: 2021-04-13

## 2021-04-10 RX ORDER — ALBUTEROL 90 UG/1
1 AEROSOL, METERED ORAL EVERY 4 HOURS
Refills: 0 | Status: DISCONTINUED | OUTPATIENT
Start: 2021-04-10 | End: 2021-04-13

## 2021-04-10 RX ORDER — ACETAMINOPHEN 500 MG
650 TABLET ORAL EVERY 6 HOURS
Refills: 0 | Status: DISCONTINUED | OUTPATIENT
Start: 2021-04-10 | End: 2021-04-13

## 2021-04-10 RX ORDER — VANCOMYCIN HCL 1 G
1000 VIAL (EA) INTRAVENOUS EVERY 12 HOURS
Refills: 0 | Status: DISCONTINUED | OUTPATIENT
Start: 2021-04-10 | End: 2021-04-10

## 2021-04-10 RX ORDER — IPRATROPIUM/ALBUTEROL SULFATE 18-103MCG
3 AEROSOL WITH ADAPTER (GRAM) INHALATION EVERY 4 HOURS
Refills: 0 | Status: DISCONTINUED | OUTPATIENT
Start: 2021-04-10 | End: 2021-04-13

## 2021-04-10 RX ADMIN — SODIUM CHLORIDE 100 MILLILITER(S): 9 INJECTION, SOLUTION INTRAVENOUS at 15:22

## 2021-04-10 RX ADMIN — ENOXAPARIN SODIUM 40 MILLIGRAM(S): 100 INJECTION SUBCUTANEOUS at 06:07

## 2021-04-10 RX ADMIN — Medication 3 MILLILITER(S): at 17:23

## 2021-04-10 RX ADMIN — PIPERACILLIN AND TAZOBACTAM 200 GRAM(S): 4; .5 INJECTION, POWDER, LYOPHILIZED, FOR SOLUTION INTRAVENOUS at 01:56

## 2021-04-10 RX ADMIN — Medication 250 MILLIGRAM(S): at 05:15

## 2021-04-10 RX ADMIN — PIPERACILLIN AND TAZOBACTAM 200 GRAM(S): 4; .5 INJECTION, POWDER, LYOPHILIZED, FOR SOLUTION INTRAVENOUS at 13:30

## 2021-04-10 RX ADMIN — Medication 5 MILLIGRAM(S): at 22:03

## 2021-04-10 RX ADMIN — Medication 81 MILLIGRAM(S): at 13:21

## 2021-04-10 RX ADMIN — SODIUM CHLORIDE 500 MILLILITER(S): 9 INJECTION INTRAMUSCULAR; INTRAVENOUS; SUBCUTANEOUS at 02:43

## 2021-04-10 RX ADMIN — Medication 10 MILLILITER(S): at 15:15

## 2021-04-10 RX ADMIN — Medication 10 MILLILITER(S): at 22:08

## 2021-04-10 RX ADMIN — PIPERACILLIN AND TAZOBACTAM 200 GRAM(S): 4; .5 INJECTION, POWDER, LYOPHILIZED, FOR SOLUTION INTRAVENOUS at 06:30

## 2021-04-10 RX ADMIN — Medication 3 MILLILITER(S): at 06:07

## 2021-04-10 RX ADMIN — Medication 3 MILLILITER(S): at 22:03

## 2021-04-10 RX ADMIN — Medication 100 MILLIGRAM(S): at 11:45

## 2021-04-10 RX ADMIN — PIPERACILLIN AND TAZOBACTAM 200 GRAM(S): 4; .5 INJECTION, POWDER, LYOPHILIZED, FOR SOLUTION INTRAVENOUS at 19:57

## 2021-04-10 RX ADMIN — Medication 5 MILLIGRAM(S): at 01:56

## 2021-04-10 RX ADMIN — Medication 250 MILLIGRAM(S): at 17:44

## 2021-04-10 RX ADMIN — Medication 3 MILLILITER(S): at 01:56

## 2021-04-10 RX ADMIN — Medication 3 MILLILITER(S): at 13:21

## 2021-04-10 NOTE — PROGRESS NOTE ADULT - SUBJECTIVE AND OBJECTIVE BOX
OVERNIGHT EVENTS: TOMAS    SUBJECTIVE:  Patient seen and examined at bedside. Patient has no change in her complaints, still feels SOB with cough, this morning with no hemoptysis. Patient denies h/n/v/d, chills, cp, palpitations, abd pain, leg swelling, rashes, dysuria, and changes in BM.     Vital Signs Last 12 Hrs  T(F): --98.3  HR: --67-91  BP: --100/66  BP(mean): --  RR: --17  SpO2: -- 96% on 3L  I&O's Summary    PHYSICAL EXAM:  Constitutional: Obese woman, NAD, comfortable in bed.  HEENT: NC/AT, PERRLA, EOMI, no conjunctival pallor or scleral icterus, MMM  Neck: Supple, no JVD  Respiratory:  poor air movement, mild crackles at bases, no noted rales/wheezing.   Cardiovascular: RRR, normal S1 and S2, no m/r/g.   Gastrointestinal: +BS, soft NTND, no guarding or rebound tenderness, no palpable masses   Extremities: wwp; no cyanosis, clubbing or edema.   Vascular: Pulses equal and strong throughout.   Neurological: AAOx3, no CN deficits, strength and sensation intact throughout.   Skin: No gross skin abnormalities or rashes    LABS:                        11.8   11.03 )-----------( 210      ( 10 Apr 2021 07:45 )             36.6     04-10    139  |  106  |  13  ----------------------------<  156<H>  4.6   |  22  |  0.63    Ca    8.9      10 Apr 2021 07:45  Phos  2.8     04-10  Mg     1.7     04-10    TPro  6.6  /  Alb  3.3  /  TBili  0.6  /  DBili  x   /  AST  33  /  ALT  54<H>  /  AlkPhos  86  04-10    PT/INR - ( 2021 17:35 )   PT: 13.3 sec;   INR: 1.11          PTT - ( 2021 17:35 )  PTT:32.0 sec  Urinalysis Basic - ( 2021 20:55 )    Color: Yellow / Appearance: Clear / S.020 / pH: x  Gluc: x / Ketone: NEGATIVE  / Bili: Negative / Urobili: 0.2 E.U./dL   Blood: x / Protein: NEGATIVE mg/dL / Nitrite: NEGATIVE   Leuk Esterase: Small / RBC: < 5 /HPF / WBC 5-10 /HPF   Sq Epi: x / Non Sq Epi: 5-10 /HPF / Bacteria: Present /HPF          RADIOLOGY & ADDITIONAL TESTS:    MEDICATIONS  (STANDING):  ALBUTerol    90 MICROgram(s) HFA Inhaler 1 Puff(s) Inhalation every 4 hours  albuterol/ipratropium for Nebulization 3 milliLiter(s) Nebulizer every 4 hours  aspirin enteric coated 81 milliGRAM(s) Oral daily  enoxaparin Injectable 40 milliGRAM(s) SubCutaneous every 24 hours  lactated ringers. 1000 milliLiter(s) (100 mL/Hr) IV Continuous <Continuous>  magnesium sulfate  IVPB 2 Gram(s) IV Intermittent once  melatonin 5 milliGRAM(s) Oral at bedtime  piperacillin/tazobactam IVPB.. 4.5 Gram(s) IV Intermittent every 6 hours  tiotropium 18 MICROgram(s) Capsule 1 Capsule(s) Inhalation daily  vancomycin  IVPB 1000 milliGRAM(s) IV Intermittent every 12 hours    MEDICATIONS  (PRN):  acetaminophen   Tablet .. 650 milliGRAM(s) Oral every 6 hours PRN Temp greater or equal to 38C (100.4F), Mild Pain (1 - 3)  benzonatate 100 milliGRAM(s) Oral three times a day PRN mild cough  guaifenesin/dextromethorphan  Syrup 10 milliLiter(s) Oral every 6 hours PRN severe cough

## 2021-04-11 LAB
-  AMPICILLIN: SIGNIFICANT CHANGE UP
-  CLINDAMYCIN: SIGNIFICANT CHANGE UP
-  ERYTHROMYCIN: SIGNIFICANT CHANGE UP
-  LEVOFLOXACIN: SIGNIFICANT CHANGE UP
-  PENICILLIN: SIGNIFICANT CHANGE UP
-  VANCOMYCIN: SIGNIFICANT CHANGE UP
ALBUMIN SERPL ELPH-MCNC: 2.9 G/DL — LOW (ref 3.3–5)
ALP SERPL-CCNC: 71 U/L — SIGNIFICANT CHANGE UP (ref 40–120)
ALT FLD-CCNC: 37 U/L — SIGNIFICANT CHANGE UP (ref 10–45)
ANION GAP SERPL CALC-SCNC: 8 MMOL/L — SIGNIFICANT CHANGE UP (ref 5–17)
AST SERPL-CCNC: 25 U/L — SIGNIFICANT CHANGE UP (ref 10–40)
BASOPHILS # BLD AUTO: 0.03 K/UL — SIGNIFICANT CHANGE UP (ref 0–0.2)
BASOPHILS NFR BLD AUTO: 0.3 % — SIGNIFICANT CHANGE UP (ref 0–2)
BILIRUB SERPL-MCNC: 0.5 MG/DL — SIGNIFICANT CHANGE UP (ref 0.2–1.2)
BUN SERPL-MCNC: 15 MG/DL — SIGNIFICANT CHANGE UP (ref 7–23)
CALCIUM SERPL-MCNC: 8.6 MG/DL — SIGNIFICANT CHANGE UP (ref 8.4–10.5)
CHLORIDE SERPL-SCNC: 110 MMOL/L — HIGH (ref 96–108)
CO2 SERPL-SCNC: 24 MMOL/L — SIGNIFICANT CHANGE UP (ref 22–31)
CREAT SERPL-MCNC: 0.76 MG/DL — SIGNIFICANT CHANGE UP (ref 0.5–1.3)
CRP SERPL-MCNC: 2.7 MG/L — SIGNIFICANT CHANGE UP (ref 0–4)
CULTURE RESULTS: SIGNIFICANT CHANGE UP
D DIMER BLD IA.RAPID-MCNC: 250 NG/ML DDU — HIGH
EOSINOPHIL # BLD AUTO: 0.23 K/UL — SIGNIFICANT CHANGE UP (ref 0–0.5)
EOSINOPHIL NFR BLD AUTO: 2.2 % — SIGNIFICANT CHANGE UP (ref 0–6)
FERRITIN SERPL-MCNC: 435 NG/ML — HIGH (ref 15–150)
GLUCOSE SERPL-MCNC: 94 MG/DL — SIGNIFICANT CHANGE UP (ref 70–99)
HCT VFR BLD CALC: 34.4 % — LOW (ref 34.5–45)
HGB BLD-MCNC: 10.9 G/DL — LOW (ref 11.5–15.5)
IMM GRANULOCYTES NFR BLD AUTO: 0.7 % — SIGNIFICANT CHANGE UP (ref 0–1.5)
LACTATE SERPL-SCNC: 1.5 MMOL/L — SIGNIFICANT CHANGE UP (ref 0.5–2)
LYMPHOCYTES # BLD AUTO: 4.23 K/UL — HIGH (ref 1–3.3)
LYMPHOCYTES # BLD AUTO: 40.5 % — SIGNIFICANT CHANGE UP (ref 13–44)
MAGNESIUM SERPL-MCNC: 1.7 MG/DL — SIGNIFICANT CHANGE UP (ref 1.6–2.6)
MCHC RBC-ENTMCNC: 31.7 GM/DL — LOW (ref 32–36)
MCHC RBC-ENTMCNC: 32.7 PG — SIGNIFICANT CHANGE UP (ref 27–34)
MCV RBC AUTO: 103.3 FL — HIGH (ref 80–100)
METHOD TYPE: SIGNIFICANT CHANGE UP
MONOCYTES # BLD AUTO: 0.78 K/UL — SIGNIFICANT CHANGE UP (ref 0–0.9)
MONOCYTES NFR BLD AUTO: 7.5 % — SIGNIFICANT CHANGE UP (ref 2–14)
NEUTROPHILS # BLD AUTO: 5.11 K/UL — SIGNIFICANT CHANGE UP (ref 1.8–7.4)
NEUTROPHILS NFR BLD AUTO: 48.8 % — SIGNIFICANT CHANGE UP (ref 43–77)
NRBC # BLD: 0 /100 WBCS — SIGNIFICANT CHANGE UP (ref 0–0)
ORGANISM # SPEC MICROSCOPIC CNT: SIGNIFICANT CHANGE UP
ORGANISM # SPEC MICROSCOPIC CNT: SIGNIFICANT CHANGE UP
PHOSPHATE SERPL-MCNC: 4.2 MG/DL — SIGNIFICANT CHANGE UP (ref 2.5–4.5)
PLATELET # BLD AUTO: 182 K/UL — SIGNIFICANT CHANGE UP (ref 150–400)
POTASSIUM SERPL-MCNC: 4.1 MMOL/L — SIGNIFICANT CHANGE UP (ref 3.5–5.3)
POTASSIUM SERPL-SCNC: 4.1 MMOL/L — SIGNIFICANT CHANGE UP (ref 3.5–5.3)
PROT SERPL-MCNC: 5.9 G/DL — LOW (ref 6–8.3)
RBC # BLD: 3.33 M/UL — LOW (ref 3.8–5.2)
RBC # FLD: 13.2 % — SIGNIFICANT CHANGE UP (ref 10.3–14.5)
SODIUM SERPL-SCNC: 142 MMOL/L — SIGNIFICANT CHANGE UP (ref 135–145)
SPECIMEN SOURCE: SIGNIFICANT CHANGE UP
VANCOMYCIN TROUGH SERPL-MCNC: 10.2 UG/ML — SIGNIFICANT CHANGE UP (ref 10–20)
WBC # BLD: 10.45 K/UL — SIGNIFICANT CHANGE UP (ref 3.8–10.5)
WBC # FLD AUTO: 10.45 K/UL — SIGNIFICANT CHANGE UP (ref 3.8–10.5)

## 2021-04-11 PROCEDURE — 99232 SBSQ HOSP IP/OBS MODERATE 35: CPT

## 2021-04-11 RX ORDER — SENNA PLUS 8.6 MG/1
2 TABLET ORAL AT BEDTIME
Refills: 0 | Status: DISCONTINUED | OUTPATIENT
Start: 2021-04-11 | End: 2021-04-12

## 2021-04-11 RX ORDER — VANCOMYCIN HCL 1 G
1250 VIAL (EA) INTRAVENOUS EVERY 12 HOURS
Refills: 0 | Status: DISCONTINUED | OUTPATIENT
Start: 2021-04-11 | End: 2021-04-12

## 2021-04-11 RX ORDER — MAGNESIUM SULFATE 500 MG/ML
1 VIAL (ML) INJECTION ONCE
Refills: 0 | Status: COMPLETED | OUTPATIENT
Start: 2021-04-11 | End: 2021-04-11

## 2021-04-11 RX ORDER — POLYETHYLENE GLYCOL 3350 17 G/17G
17 POWDER, FOR SOLUTION ORAL ONCE
Refills: 0 | Status: COMPLETED | OUTPATIENT
Start: 2021-04-11 | End: 2021-04-11

## 2021-04-11 RX ADMIN — PIPERACILLIN AND TAZOBACTAM 200 GRAM(S): 4; .5 INJECTION, POWDER, LYOPHILIZED, FOR SOLUTION INTRAVENOUS at 09:22

## 2021-04-11 RX ADMIN — Medication 10 MILLILITER(S): at 16:00

## 2021-04-11 RX ADMIN — Medication 3 MILLILITER(S): at 12:02

## 2021-04-11 RX ADMIN — POLYETHYLENE GLYCOL 3350 17 GRAM(S): 17 POWDER, FOR SOLUTION ORAL at 11:58

## 2021-04-11 RX ADMIN — PIPERACILLIN AND TAZOBACTAM 200 GRAM(S): 4; .5 INJECTION, POWDER, LYOPHILIZED, FOR SOLUTION INTRAVENOUS at 14:58

## 2021-04-11 RX ADMIN — Medication 5 MILLIGRAM(S): at 21:35

## 2021-04-11 RX ADMIN — ENOXAPARIN SODIUM 40 MILLIGRAM(S): 100 INJECTION SUBCUTANEOUS at 05:21

## 2021-04-11 RX ADMIN — Medication 3 MILLILITER(S): at 09:22

## 2021-04-11 RX ADMIN — Medication 81 MILLIGRAM(S): at 11:59

## 2021-04-11 RX ADMIN — Medication 100 GRAM(S): at 11:59

## 2021-04-11 RX ADMIN — PIPERACILLIN AND TAZOBACTAM 200 GRAM(S): 4; .5 INJECTION, POWDER, LYOPHILIZED, FOR SOLUTION INTRAVENOUS at 23:36

## 2021-04-11 RX ADMIN — Medication 100 MILLIGRAM(S): at 12:11

## 2021-04-11 RX ADMIN — Medication 3 MILLILITER(S): at 21:35

## 2021-04-11 RX ADMIN — PIPERACILLIN AND TAZOBACTAM 200 GRAM(S): 4; .5 INJECTION, POWDER, LYOPHILIZED, FOR SOLUTION INTRAVENOUS at 03:00

## 2021-04-11 RX ADMIN — SODIUM CHLORIDE 500 MILLILITER(S): 9 INJECTION INTRAMUSCULAR; INTRAVENOUS; SUBCUTANEOUS at 00:01

## 2021-04-11 RX ADMIN — Medication 166.67 MILLIGRAM(S): at 21:35

## 2021-04-11 RX ADMIN — Medication 3 MILLILITER(S): at 05:20

## 2021-04-11 RX ADMIN — Medication 100 MILLIGRAM(S): at 22:02

## 2021-04-11 RX ADMIN — Medication 3 MILLILITER(S): at 16:00

## 2021-04-11 RX ADMIN — Medication 250 MILLIGRAM(S): at 05:20

## 2021-04-12 ENCOUNTER — TRANSCRIPTION ENCOUNTER (OUTPATIENT)
Age: 71
End: 2021-04-12

## 2021-04-12 LAB
ALBUMIN SERPL ELPH-MCNC: 3 G/DL — LOW (ref 3.3–5)
ALP SERPL-CCNC: 74 U/L — SIGNIFICANT CHANGE UP (ref 40–120)
ALT FLD-CCNC: 35 U/L — SIGNIFICANT CHANGE UP (ref 10–45)
ANION GAP SERPL CALC-SCNC: 10 MMOL/L — SIGNIFICANT CHANGE UP (ref 5–17)
AST SERPL-CCNC: 26 U/L — SIGNIFICANT CHANGE UP (ref 10–40)
BASOPHILS # BLD AUTO: 0.03 K/UL — SIGNIFICANT CHANGE UP (ref 0–0.2)
BASOPHILS NFR BLD AUTO: 0.3 % — SIGNIFICANT CHANGE UP (ref 0–2)
BILIRUB SERPL-MCNC: 0.7 MG/DL — SIGNIFICANT CHANGE UP (ref 0.2–1.2)
BUN SERPL-MCNC: 12 MG/DL — SIGNIFICANT CHANGE UP (ref 7–23)
CALCIUM SERPL-MCNC: 8.8 MG/DL — SIGNIFICANT CHANGE UP (ref 8.4–10.5)
CHLORIDE SERPL-SCNC: 107 MMOL/L — SIGNIFICANT CHANGE UP (ref 96–108)
CO2 SERPL-SCNC: 25 MMOL/L — SIGNIFICANT CHANGE UP (ref 22–31)
CREAT SERPL-MCNC: 0.71 MG/DL — SIGNIFICANT CHANGE UP (ref 0.5–1.3)
CRP SERPL-MCNC: 7.5 MG/L — HIGH (ref 0–4)
D DIMER BLD IA.RAPID-MCNC: 229 NG/ML DDU — SIGNIFICANT CHANGE UP
EOSINOPHIL # BLD AUTO: 0.41 K/UL — SIGNIFICANT CHANGE UP (ref 0–0.5)
EOSINOPHIL NFR BLD AUTO: 4.2 % — SIGNIFICANT CHANGE UP (ref 0–6)
FERRITIN SERPL-MCNC: 409 NG/ML — HIGH (ref 15–150)
GLUCOSE SERPL-MCNC: 90 MG/DL — SIGNIFICANT CHANGE UP (ref 70–99)
HCT VFR BLD CALC: 34.5 % — SIGNIFICANT CHANGE UP (ref 34.5–45)
HGB BLD-MCNC: 11.2 G/DL — LOW (ref 11.5–15.5)
IMM GRANULOCYTES NFR BLD AUTO: 0.5 % — SIGNIFICANT CHANGE UP (ref 0–1.5)
LACTATE SERPL-SCNC: 2.3 MMOL/L — HIGH (ref 0.5–2)
LYMPHOCYTES # BLD AUTO: 3.87 K/UL — HIGH (ref 1–3.3)
LYMPHOCYTES # BLD AUTO: 40 % — SIGNIFICANT CHANGE UP (ref 13–44)
MAGNESIUM SERPL-MCNC: 1.8 MG/DL — SIGNIFICANT CHANGE UP (ref 1.6–2.6)
MCHC RBC-ENTMCNC: 32.5 GM/DL — SIGNIFICANT CHANGE UP (ref 32–36)
MCHC RBC-ENTMCNC: 33.5 PG — SIGNIFICANT CHANGE UP (ref 27–34)
MCV RBC AUTO: 103.3 FL — HIGH (ref 80–100)
MONOCYTES # BLD AUTO: 0.79 K/UL — SIGNIFICANT CHANGE UP (ref 0–0.9)
MONOCYTES NFR BLD AUTO: 8.2 % — SIGNIFICANT CHANGE UP (ref 2–14)
MRSA PCR RESULT.: NEGATIVE — SIGNIFICANT CHANGE UP
NEUTROPHILS # BLD AUTO: 4.52 K/UL — SIGNIFICANT CHANGE UP (ref 1.8–7.4)
NEUTROPHILS NFR BLD AUTO: 46.8 % — SIGNIFICANT CHANGE UP (ref 43–77)
NRBC # BLD: 0 /100 WBCS — SIGNIFICANT CHANGE UP (ref 0–0)
PHOSPHATE SERPL-MCNC: 4.2 MG/DL — SIGNIFICANT CHANGE UP (ref 2.5–4.5)
PLATELET # BLD AUTO: 195 K/UL — SIGNIFICANT CHANGE UP (ref 150–400)
POTASSIUM SERPL-MCNC: 3.8 MMOL/L — SIGNIFICANT CHANGE UP (ref 3.5–5.3)
POTASSIUM SERPL-SCNC: 3.8 MMOL/L — SIGNIFICANT CHANGE UP (ref 3.5–5.3)
PROT SERPL-MCNC: 6 G/DL — SIGNIFICANT CHANGE UP (ref 6–8.3)
RBC # BLD: 3.34 M/UL — LOW (ref 3.8–5.2)
RBC # FLD: 13.2 % — SIGNIFICANT CHANGE UP (ref 10.3–14.5)
S AUREUS DNA NOSE QL NAA+PROBE: NEGATIVE — SIGNIFICANT CHANGE UP
SODIUM SERPL-SCNC: 142 MMOL/L — SIGNIFICANT CHANGE UP (ref 135–145)
WBC # BLD: 9.67 K/UL — SIGNIFICANT CHANGE UP (ref 3.8–10.5)
WBC # FLD AUTO: 9.67 K/UL — SIGNIFICANT CHANGE UP (ref 3.8–10.5)

## 2021-04-12 PROCEDURE — 99223 1ST HOSP IP/OBS HIGH 75: CPT | Mod: CS

## 2021-04-12 PROCEDURE — 99232 SBSQ HOSP IP/OBS MODERATE 35: CPT | Mod: CS

## 2021-04-12 RX ORDER — MAGNESIUM SULFATE 500 MG/ML
2 VIAL (ML) INJECTION ONCE
Refills: 0 | Status: COMPLETED | OUTPATIENT
Start: 2021-04-12 | End: 2021-04-12

## 2021-04-12 RX ORDER — ISOSORBIDE DINITRATE 5 MG/1
1 TABLET ORAL
Qty: 0 | Refills: 0 | DISCHARGE

## 2021-04-12 RX ORDER — ALBUTEROL 90 UG/1
1 AEROSOL, METERED ORAL
Qty: 1 | Refills: 0
Start: 2021-04-12 | End: 2021-04-18

## 2021-04-12 RX ORDER — CIPROFLOXACIN LACTATE 400MG/40ML
1 VIAL (ML) INTRAVENOUS
Qty: 4 | Refills: 0
Start: 2021-04-12 | End: 2021-04-15

## 2021-04-12 RX ORDER — SENNA PLUS 8.6 MG/1
2 TABLET ORAL AT BEDTIME
Refills: 0 | Status: DISCONTINUED | OUTPATIENT
Start: 2021-04-12 | End: 2021-04-13

## 2021-04-12 RX ORDER — GUAIFENESIN/DEXTROMETHORPHAN 600MG-30MG
10 TABLET, EXTENDED RELEASE 12 HR ORAL
Qty: 1 | Refills: 0
Start: 2021-04-12 | End: 2021-04-18

## 2021-04-12 RX ORDER — POLYETHYLENE GLYCOL 3350 17 G/17G
17 POWDER, FOR SOLUTION ORAL DAILY
Refills: 0 | Status: DISCONTINUED | OUTPATIENT
Start: 2021-04-12 | End: 2021-04-13

## 2021-04-12 RX ADMIN — PIPERACILLIN AND TAZOBACTAM 200 GRAM(S): 4; .5 INJECTION, POWDER, LYOPHILIZED, FOR SOLUTION INTRAVENOUS at 23:42

## 2021-04-12 RX ADMIN — Medication 3 MILLILITER(S): at 01:01

## 2021-04-12 RX ADMIN — Medication 10 MILLILITER(S): at 22:00

## 2021-04-12 RX ADMIN — Medication 650 MILLIGRAM(S): at 23:01

## 2021-04-12 RX ADMIN — Medication 650 MILLIGRAM(S): at 12:00

## 2021-04-12 RX ADMIN — PIPERACILLIN AND TAZOBACTAM 200 GRAM(S): 4; .5 INJECTION, POWDER, LYOPHILIZED, FOR SOLUTION INTRAVENOUS at 05:17

## 2021-04-12 RX ADMIN — Medication 3 MILLILITER(S): at 18:07

## 2021-04-12 RX ADMIN — ENOXAPARIN SODIUM 40 MILLIGRAM(S): 100 INJECTION SUBCUTANEOUS at 05:18

## 2021-04-12 RX ADMIN — Medication 3 MILLILITER(S): at 12:34

## 2021-04-12 RX ADMIN — Medication 81 MILLIGRAM(S): at 10:51

## 2021-04-12 RX ADMIN — PIPERACILLIN AND TAZOBACTAM 200 GRAM(S): 4; .5 INJECTION, POWDER, LYOPHILIZED, FOR SOLUTION INTRAVENOUS at 10:51

## 2021-04-12 RX ADMIN — Medication 166.67 MILLIGRAM(S): at 08:55

## 2021-04-12 RX ADMIN — Medication 650 MILLIGRAM(S): at 10:55

## 2021-04-12 RX ADMIN — Medication 3 MILLILITER(S): at 21:07

## 2021-04-12 RX ADMIN — Medication 10 MILLILITER(S): at 12:48

## 2021-04-12 RX ADMIN — Medication 3 MILLILITER(S): at 05:17

## 2021-04-12 RX ADMIN — Medication 50 GRAM(S): at 12:34

## 2021-04-12 RX ADMIN — POLYETHYLENE GLYCOL 3350 17 GRAM(S): 17 POWDER, FOR SOLUTION ORAL at 10:51

## 2021-04-12 RX ADMIN — Medication 5 MILLIGRAM(S): at 22:02

## 2021-04-12 RX ADMIN — Medication 650 MILLIGRAM(S): at 01:16

## 2021-04-12 RX ADMIN — PIPERACILLIN AND TAZOBACTAM 200 GRAM(S): 4; .5 INJECTION, POWDER, LYOPHILIZED, FOR SOLUTION INTRAVENOUS at 17:05

## 2021-04-12 RX ADMIN — Medication 650 MILLIGRAM(S): at 22:01

## 2021-04-12 NOTE — PROGRESS NOTE ADULT - PROBLEM SELECTOR PLAN 1
pt meeting 4/4 SIRS criteria, also with lactate, mild LFT elevation - although lactate also possibly elevated from significant tachypnea on arrival  30 cc/kg = 2200cc, pt received 1L and tachycardia improved   - c/w fluids, given hx diastolic HF will give slowly at 100cc/hr for 10hr   - f/u bcx  - suspect superimposed bacterial pna, care as below
pt meeting 4/4 SIRS criteria, also with lactate, mild LFT elevation - although lactate also possibly elevated from significant tachypnea on arrival  30 cc/kg = 2200cc, pt received 1L and tachycardia improved   - c/w fluids, given hx diastolic HF will give slowly at 100cc/hr for 10hr   - f/u bcx  - suspect superimposed bacterial pna, care as below

## 2021-04-12 NOTE — CONSULT NOTE ADULT - SUBJECTIVE AND OBJECTIVE BOX
PULMONARY SERVICE INITIAL CONSULT NOTE    HPI:  Patient is a 69 y/o female, former smoker, w/ PMHx HTN, VINCE (does not use CPAP), pulmonary fibrosis likely 2/2 9/11 exposure (not on home O2 pre-covid), stage I diastolic HfpEF (EF 75%) pending elective cardiac cath (deferred due to covid+) for chronic SOB and known ischemic heart disease on NST (Feb 2021), recent admission last month for COVID-19 (got 3 doses of remdesevir, discharged on home O2, decadron; course c/b sinus bradycardia felt by EP to be 2/2 covid) who re-presents due to persistent SOB, hemoptysis, and pleuritic pain. At home she was using 2-3L at rest and 4-5 L with exercise (stable usage since discharge). Patient notes that for the past couple days she has been coughing up some small amounts of blood clot in the AM only. Otherwise her cough is dry. Per her outpatient providers, she completed a z-ximena yesterday as well as a 9 day prednisone taper (30mg x 3, 20mg x 3, 10mg x 3). Noticed some improvement while on the prednisone but now feels worse again. Denies fevers, chills, headache, nausea, vomiting, abd pain, diarrhea, constipation, leg pain/swelling. Does have chest pain but only with deep breathing. Pulmonary consulted for hypoxic respiratory failure in setting of recent Covid-19 infection.     REVIEW OF SYSTEMS:  All additional ROS negative.     PAST MEDICAL & SURGICAL HISTORY:  Hypertension    Obstructive sleep apnea    Pneumonia due to COVID-19 virus    Pulmonary fibrosis    Ischemic heart disease    Chronic diastolic congestive heart failure    No significant past surgical history    FAMILY HISTORY:    SOCIAL HISTORY:  Smoking Status: [ ] Current, [x ] Former, [ ] Never  Pack Years:    MEDICATIONS:  Pulmonary:  ALBUTerol    90 MICROgram(s) HFA Inhaler 1 Puff(s) Inhalation every 4 hours  albuterol/ipratropium for Nebulization 3 milliLiter(s) Nebulizer every 4 hours  benzonatate 100 milliGRAM(s) Oral three times a day PRN  guaifenesin/dextromethorphan  Syrup 10 milliLiter(s) Oral every 6 hours PRN  tiotropium 18 MICROgram(s) Capsule 1 Capsule(s) Inhalation daily    Antimicrobials:  piperacillin/tazobactam IVPB.. 4.5 Gram(s) IV Intermittent every 6 hours    Anticoagulants:  aspirin enteric coated 81 milliGRAM(s) Oral daily  enoxaparin Injectable 40 milliGRAM(s) SubCutaneous every 24 hours    Onc:    GI/:  polyethylene glycol 3350 17 Gram(s) Oral daily  senna 2 Tablet(s) Oral at bedtime    Endocrine:    Cardiac:    Other Medications:  acetaminophen   Tablet .. 650 milliGRAM(s) Oral every 6 hours PRN  magnesium sulfate  IVPB 2 Gram(s) IV Intermittent once  melatonin 5 milliGRAM(s) Oral at bedtime    Allergies    No Known Allergies    Intolerances    Vital Signs Last 24 Hrs  T(C): 37.1 (12 Apr 2021 18:34), Max: 37.1 (12 Apr 2021 18:34)  T(F): 98.7 (12 Apr 2021 18:34), Max: 98.7 (12 Apr 2021 18:34)  HR: 91 (12 Apr 2021 17:15) (67 - 91)  BP: 132/81 (12 Apr 2021 17:15) (102/66 - 146/80)  BP(mean): --  RR: 18 (12 Apr 2021 17:15) (16 - 20)  SpO2: 93% (12 Apr 2021 17:15) (93% - 95%)    04-12 @ 07:01  -  04-12 @ 19:17  --------------------------------------------------------  IN: 400 mL / OUT: 0 mL / NET: 400 mL    PHYSICAL EXAM:  Constitutional: WDWN  Head: NC/AT  EENT: PERRL, anicteric sclera; oropharynx clear, MMM  Neck: supple, no appreciable JVD  Respiratory: CTA B/L; no W/R/R  Cardiovascular: +S1/S2, RRR  Gastrointestinal: soft, NT/ND  Extremities: WWP; no edema, clubbing or cyanosis  Vascular: 2+ radial pulses B/L  Neurological: awake and alert; DENNY    LABS:  CBC Full  -  ( 12 Apr 2021 08:43 )  WBC Count : 9.67 K/uL  RBC Count : 3.34 M/uL  Hemoglobin : 11.2 g/dL  Hematocrit : 34.5 %  Platelet Count - Automated : 195 K/uL  Mean Cell Volume : 103.3 fl  Mean Cell Hemoglobin : 33.5 pg  Mean Cell Hemoglobin Concentration : 32.5 gm/dL  Auto Neutrophil # : 4.52 K/uL  Auto Lymphocyte # : 3.87 K/uL  Auto Monocyte # : 0.79 K/uL  Auto Eosinophil # : 0.41 K/uL  Auto Basophil # : 0.03 K/uL  Auto Neutrophil % : 46.8 %  Auto Lymphocyte % : 40.0 %  Auto Monocyte % : 8.2 %  Auto Eosinophil % : 4.2 %  Auto Basophil % : 0.3 %    04-12    142  |  107  |  12  ----------------------------<  90  3.8   |  25  |  0.71    Ca    8.8      12 Apr 2021 08:43  Phos  4.2     04-12  Mg     1.8     04-12    TPro  6.0  /  Alb  3.0<L>  /  TBili  0.7  /  DBili  x   /  AST  26  /  ALT  35  /  AlkPhos  74  04-12    RADIOLOGY & ADDITIONAL STUDIES:  CT 4/9: GGO in right upper lobe, heterogenous, mainly peripheral distribution of crazy paving, traction bronchiectasis and traction bronchiolectasis. No dense consolidation or effusions.

## 2021-04-12 NOTE — DISCHARGE NOTE PROVIDER - HOSPITAL COURSE
70F former smoker, with PMH HTN, VINCE, pulmonary fibrosis, HFpEF, ischemic heart disease, recent admission for COVID-19 discharged on O2 who re-presents due to worsening cough, hemoptysis, pleuritic chest pain possibly due to bacterial infection versus worsening of pulmonary fibrosis due to recent covid.     Problem List/Main Diagnoses (system-based):   #Severe sepsis.   pt meeting 4/4 SIRS criteria, also with lactate, mild LFT elevation - although lactate also possibly elevated from significant tachypnea on arrival  30 cc/kg = 2200cc, pt received 1L and tachycardia improved   - Given hx diastolic HF got IVF at 100cc/hr for 10hr - total 2L  - blood cultures NGTD    #AHRF  2/2 CAP as below. No indication for steroids. On discharge patient is back to her home O2 levels of 2-3L.    #Community acquired bacterial pneumonia.   No obvious focal finding on imaging however worsening cough, fever, mildly elevated procal. Due to risk factors for mrsa/psuedomonas given recent hospitalization and bronchiectasis patient was treated with vanc/zosyn for CAP w/ risk factors. MRSA swab was neg and venc discontinue. Upon discharge started on Levaquin     #Pulmonary fibrosis.   s/p outpatient prednisone taper for 9 days finishing on 4/8, per patient's pulmonologist Dr. Byrd no need for steroids for now. three insults on lungs: Post 9/11 pneumonitis, post COVID and Emphysema (CT with bronchiolectasias)     #COVID-19.   Initially tested + in mid march, s/p 3 doses remdesivir, 10 days decadron last admission  patient does not meet criteria for further covid tx.     #Hypertension.  on isordil 30 at home, hold for sepsis. Will hold upon discharge, will f/u with PCP    #Ischemic heart disease.  pending cath once stable/ covid neg, chest pain is pleuritic in nature, EKG unchanged from prior, trops neg  low suspicion presentation is due to ACS, per last discharge, not on statin due to fatty liver.c /w toprol 25    Inpatient treatment course: As above  New medications: Holding Isordil as BP 100s/50s, Levaquin 750mg for 4 days, last 4/16. Tessalon perle, albuterol PRN  Labs to be followed outpatient: none  Exam to be followed outpatient: none   70F former smoker, with PMH HTN, VINCE, pulmonary fibrosis, HFpEF, ischemic heart disease, recent admission for COVID-19 discharged on O2 who re-presents due to worsening cough, hemoptysis, pleuritic chest pain possibly due to bacterial infection versus worsening of pulmonary fibrosis due to recent covid.     Problem List/Main Diagnoses (system-based):   #Severe sepsis.   pt meeting 4/4 SIRS criteria, also with lactate, mild LFT elevation - although lactate also possibly elevated from significant tachypnea on arrival  30 cc/kg = 2200cc, pt received 1L and tachycardia improved   - Given hx diastolic HF got IVF at 100cc/hr for 10hr - total 2L  - blood cultures NGTD    #AHRF  2/2 CAP as below. No indication for steroids. On discharge patient is back to her home O2 levels of 2-3L.    #Community acquired bacterial pneumonia.   No obvious focal finding on imaging however worsening cough, fever, mildly elevated procal. Due to risk factors for mrsa/psuedomonas given recent hospitalization and bronchiectasis patient was treated with vanc/zosyn for CAP w/ risk factors. MRSA swab was neg and venc discontinue. Upon discharge started on Levaquin     #Pulmonary fibrosis.   s/p outpatient prednisone taper for 9 days finishing on 4/8, per patient's pulmonologist Dr. Byrd no need for steroids for now. three insults on lungs: Post 9/11 pneumonitis, post COVID and Emphysema (CT with bronchiolectasias)     #COVID-19.   Initially tested + in mid march, s/p 3 doses remdesivir, 10 days decadron last admission  patient does not meet criteria for further covid tx or AC.    #Hypertension.  on isordil 30 at home, hold for sepsis. Will hold upon discharge, will f/u with PCP    #Ischemic heart disease.  pending cath once stable/ covid neg, chest pain is pleuritic in nature, EKG unchanged from prior, trops neg  low suspicion presentation is due to ACS, per last discharge, not on statin due to fatty liver.c /w toprol 25    Inpatient treatment course: As above  New medications: Holding Isordil as BP 100s/50s, Levaquin 750mg for 3 days, last 4/16. Tessalon perle, albuterol PRN  Labs to be followed outpatient: none  Exam to be followed outpatient: none

## 2021-04-12 NOTE — PROGRESS NOTE ADULT - ATTENDING COMMENTS
69 y/o female, former smoker, h/o HTN, VINCE (does not use CPAP), pulmonary fibrosis stage I diastolic HfpEF (EF 75%) pending elective cardiac cath positive NST (Feb 2021), recent admission last month for COVID-19  presented with worsening cough and admitted for Sepsis due to Cap and started on antibiotics but is in mild distress today. Pt denies any chest pain and trop and ekg unremarkable. Pulmonary consult awaited    Assesment  Sepsis due to CAP  h/o covid( Rx with remdesevir and dexamethasone)  Emphysema  ILD  CAD with + stress test    Plan  Pt has multiple causes for her acute worsening resp status.   ? CAP on underlying emphysema/Interstitial fibrosis from previous 911 exposure  Needs Pulm consult if she needs chronic steroids/bronch  Pt also needs cath after resp status is better unless urgent  Monitor for cardiac complaints    consider Rx for Hap if needed  f/u sepsis w/u: Blood c/s,urine c/s     Monitor D dimer and start full dose anticoagulation if benefits outweigh risks as per hospital protocol  CT chest can be negative due to microvascular emboli in covid pts  cont dvt prophylaxis    Consider evaluation for Cardiomyopathy if chest pain/palpitations  consider tele monitoring and Cardio consult as needed    Taper o2 as tolerated  resting/ambulating spo2  PT eval and discharge planning with Home   Plan discussed with patient
69 y/o female, former smoker, h/o HTN, VINCE (does not use CPAP), pulmonary fibrosis stage I diastolic HfpEF (EF 75%) pending elective cardiac cath positive NST (Feb 2021), recent admission last month for COVID-19  presented with worsening cough and admitted for Sepsis due to Cap and started on antibiotics and is better. Pt denies any chest pain and trop and ekg unremarkable. Pulmonary consult awaited    Assesment  Sepsis due to CAP  h/o covid( Rx with remdesevir and dexamethasone)  Emphysema  ILD  CAD with + stress test    Plan  Pt has multiple causes for her acute worsening resp status.   ? CAP on underlying emphysema/Interstitial fibrosis from previous 911 exposure  Needs Pulm consult if she needs chronic steroids/bronch  Pt also needs cath after resp status is better unless urgent  Monitor for cardiac complaints    consider Rx for Hap if needed  f/u sepsis w/u: Blood c/s,urine c/s     Monitor D dimer and start full dose anticoagulation if benefits outweigh risks as per hospital protocol  CT chest can be negative due to microvascular emboli in covid pts  consider CT chest if concern for massive/submassive PE  cont dvt prophylaxis    Consider evaluation for Cardiomyopathy if chest pain/palpitations  consider tele monitoring and Cardio consult as needed    Taper o2 as tolerated  resting/ambulating spo2  PT eval and discharge planning with Home   Plan discussed with patient
69 y/o female, former smoker, h/o HTN, VINCE (does not use CPAP), pulmonary fibrosis stage I diastolic HfpEF (EF 75%) pending elective cardiac cath positive NST (Feb 2021), recent admission last month for COVID-19  presented with worsening cough and admitted for Sepsis due to Cap and started on antibiotics  and continues to have resp distress. Pt denies any chest pain and trop and ekg unremarkable. Pulmonary consult awaited    Assesment  Sepsis due to Hap  Acute hypoxic resp failure suspect Hap/pcp/with underlying emphysema/ILD  h/o covid( Rx with remdesevir and dexamethasone)  Emphysema  ILD  CAD with + stress test    Plan  MRSA swab neg  Pt has multiple causes for her acute worsening resp status.   ? HAP on underlying emphysema/Interstitial fibrosis from previous 911 exposure  Pulm consult   Pt also needs cardiac cath after resp status is better unless urgent  Monitor for cardiac complaints    f/u sepsis w/u: Blood c/s,urine c/s     Monitor D dimer and consider full dose anticoagulation if benefits outweigh risks as per hospital protocol  CT chest neg on admission  CT chest can be negative due to microvascular emboli in covid pts  cont dvt prophylaxis    Consider evaluation for Cardiomyopathy if chest pain/palpitations  consider tele monitoring and Cardio consult as needed    Taper o2 as tolerated  resting/ambulating spo2  PT eval and discharge planning with Home   Plan discussed with patient.

## 2021-04-12 NOTE — CONSULT NOTE ADULT - ASSESSMENT
Patient is a 71 y/o female, former smoker, w/ PMHx HTN, VINCE (does not use CPAP), pulmonary fibrosis likely 2/2 9/11 exposure (not on home O2 pre-covid), stage I diastolic HfpEF (EF 75%) pending elective cardiac cath (deferred due to covid+) for chronic SOB and known ischemic heart disease on NST (Feb 2021), recent admission last month for COVID-19 (got 3 doses of remdesevir, discharged on home O2, decadron; course c/b sinus bradycardia felt by EP to be 2/2 covid) who re-presents due to persistent SOB, hemoptysis, and pleuritic pain, pulmonary consulted for hypoxic respiratory failure in setting of chronic ILD and recent Covid-19 infection.    #Hypoxic respiratory failure  Patient with history of ILD and recent admission for COVID-19 infection 3/12-3/19 s/p remdesevir and decadron, DC'd on home O2. At home, was using 2-3L at rest and 4-5 L with exercise, recently completed z-pack and a 9 day prednisone taper. O2 requirements in hospital are stable since discharge. CT scan shows small area of GGO in RUL and heterogenous mostly peripheral crazy paving with bronchiectasis and bronchiolectasis w/o honeycombing, consolidations or effusions, likely representing underlying fibrosis and resolving coving infection (unable to discern degree of covid changes as no old CT to compare to).   -does not meet criteria for ILD exacerbation and CT findings not c/w organizing or eosinophilic pneumonia and no peripheral eosinophilia  -no worsening or increase in O2 requirements since discharge  -hypoxia likely representing resolving covid-19 inflammation on underlying fibrotic lung with poor reserve  -Patient would likely not benefit from steroids at this time; if patient has increasing O2 requirements can get a repeat CT scan and can reconsider steroids at that time  -continue O2 via NC to keep O2 >88%    #Covid-19 infection  Patient testing positive at this admission after recent admission in march 3/12-3/19. Patient now with positive IgG Ab. Positive test likely does not reflect continued active infection.  -s/p remdesivir and decadron last admission    #ILD  Patient with history of ILD.  -continue outpatient management and monitoring post-discharge    Thank you for this interesting consult. Pulmonary will sign off at this time. Please call back with any additional questions.

## 2021-04-12 NOTE — DISCHARGE NOTE PROVIDER - CARE PROVIDER_API CALL
Sharan Park)  Medicine  [address] [place] [address]  Phone: [phone]  Fax: [phone]  Follow Up Time: 2 weeks    Roman Byrd)  Critical Care Medicine; Pulmonary Disease  [address] [place] [address], [facility] [address]  [place] [address]  Phone: [phone]  Fax: [phone]  Follow Up Time: 2 weeks

## 2021-04-12 NOTE — PROGRESS NOTE ADULT - PROBLEM SELECTOR PLAN 4
initially tested + in mid march  s/p 3 doses remdesivir, 10 days decadron  patient does not meet criteria for further covid tx
initially tested + in mid march  s/p 3 doses remdesivir, 10 days decadron  patient does not meet criteria for further covid tx

## 2021-04-12 NOTE — DISCHARGE NOTE PROVIDER - NSDCFUADDAPPT_GEN_ALL_CORE_FT
Please follow up with your primary care provider Dr. Park. Please call to his office to make an appointment within the next 2 weeks from this discharge. [phone]    Please follow up with your Pulmonologist Dr. Byrd. Please call to his office to make an appointment within the next 2 weeks from this discharge. [phone] Please follow up with your primary care provider Dr. Park. Please call to his office to make an appointment within the next 2 weeks from this discharge. [phone]    Please follow up with your Pulmonologist Dr. Byrd on this Friday 4/16 at 11:30AM virtual appointment. Please call the office with any question you might have [phone]

## 2021-04-12 NOTE — DISCHARGE NOTE PROVIDER - PROVIDER TOKENS
PROVIDER:[TOKEN:[[id number]],FOLLOWUP:[2 weeks]],PROVIDER:[TOKEN:[[id number]],FOLLOWUP:[2 weeks]]

## 2021-04-12 NOTE — PROGRESS NOTE ADULT - PROBLEM SELECTOR PLAN 2
no obvious focal finding on imaging however worsening cough, fever, mildly elevated procal  with risk factors for mrsa/psuedomonas given recent hospitalization and bronchiectasis ---> will continue with vanc/zosyn for CAP w/ risk factors [not hap]  trend procal
no obvious focal finding on imaging however worsening cough, fever, mildly elevated procal  with risk factors for mrsa/psuedomonas given recent hospitalization and bronchiectasis ---> will continue with vanc/zosyn for CAP w/ risk factors [not hap]  trend procal

## 2021-04-12 NOTE — PROGRESS NOTE ADULT - PROBLEM SELECTOR PLAN 3
s/p outpatient prednisone taper for 9 days finishing on 4/8  rec'd decadron in ER  likely exacerbated by covid and patient may have irreversible damage to lung from covid and may require oxygen long term  Per patient' pulmonologist Dr. Byrd no need for further steroids
s/p outpatient prednisone taper for 9 days finishing on 4/8  rec'd decadron in ER  likely exacerbated by covid and patient may have irreversible damage to lung from covid and may require oxygen long term  Per patient' pulmonologist Dr. Byrd no need for further steroids

## 2021-04-12 NOTE — DISCHARGE NOTE PROVIDER - NSDCCPCAREPLAN_GEN_ALL_CORE_FT
PRINCIPAL DISCHARGE DIAGNOSIS  Diagnosis: Severe sepsis  Assessment and Plan of Treatment: You were hospitalized for severe sepsis. Sepsis is a life-threatening complication of an infection. Sepsis occurs when chemicals released in the bloodstream to fight an infection trigger inflammation throughout the body. This can cause a cascade of changes that damage multiple organ systems, leading them to fail, sometimes even resulting in death. Symptoms include fever, difficulty breathing, low blood pressure, fast heart rate, and mental confusion. You were found to have bacterial pneumonia as a complication of your COVID pneumonia. You complited 3 day course of azithromycin as an outpatient prior your hospitalization.      SECONDARY DISCHARGE DIAGNOSES  Diagnosis: Community acquired bacterial pneumonia  Assessment and Plan of Treatment: You were diagnosed with pneumonia, or an infection of the lungs during your admission to [facility]. This was noted based on your symptom profile and findings on chest X-ray. You were treated with antibiotics throughout your hospital course. Please continue to take levofloxacin ( Levaquin) 750mg once a day for 4 more days (last day 4/16). Please follow-up with your primary care physician when you leave the hospital. Should you experience symptoms such as but not limited to: worsening shortness of breath, wheezing, severe cough, coughing bloody sputum, unrelenting/high fever (>103 F or lasting >3 days), and chest pain, please return to the emergency department for interval evaluation.    Diagnosis: Pulmonary fibrosis  Assessment and Plan of Treatment: You have a known condition of Pulmonary fibrosis. You are following with you pulmonologist Dr. garcia that made aware for your admission. There was no indication to start you on new steroids as your exam was without any signs of emphysema exacerbation, and you have completed your Decadrone dose in the past for COVID infection. Please follow up with your pulmonologist within the next 2 weeks after your discharge.    Diagnosis: Hypertension  Assessment and Plan of Treatment: You have a known history of high blood pressure prior to your admission. To manage this you are on a medication called Isordil, this medication was held as your blood pressure was normal/ low due to your sepsis. High blood pressure can cause damage to your heart and kidneys and increases your risk of heart attack and stroke. To avoid this, It is important that you follow up with your primary care physician on a regular basis to make sure your blood pressure continues to be well controlled, please discuss with your PCP on the need to continue your blood pressure medication. If you experience symptoms such as but not limited to: sudden onset blurry vision, nausea, vomiting, chest pain, shortness of breath, or palpitations, please go to the nearest emergency room.     PRINCIPAL DISCHARGE DIAGNOSIS  Diagnosis: Severe sepsis  Assessment and Plan of Treatment: You were hospitalized for severe sepsis. Sepsis is a life-threatening complication of an infection. Sepsis occurs when chemicals released in the bloodstream to fight an infection trigger inflammation throughout the body. This can cause a cascade of changes that damage multiple organ systems, leading them to fail, sometimes even resulting in death. Symptoms include fever, difficulty breathing, low blood pressure, fast heart rate, and mental confusion. You were found to have bacterial pneumonia as a complication of your COVID pneumonia. You complited 3 day course of azithromycin as an outpatient prior your hospitalization.      SECONDARY DISCHARGE DIAGNOSES  Diagnosis: Community acquired bacterial pneumonia  Assessment and Plan of Treatment: You were diagnosed with pneumonia, or an infection of the lungs during your admission to [facility]. This was noted based on your symptom profile and findings on chest X-ray. You were treated with antibiotics throughout your hospital course. Please continue to take levofloxacin ( Levaquin) 750mg once a day for 3 more days (last day 4/16). Please follow-up with your primary care physician when you leave the hospital. Should you experience symptoms such as but not limited to: worsening shortness of breath, wheezing, severe cough, coughing bloody sputum, unrelenting/high fever (>103 F or lasting >3 days), and chest pain, please return to the emergency department for interval evaluation.    Diagnosis: Pulmonary fibrosis  Assessment and Plan of Treatment: You have a known condition of Pulmonary fibrosis. You are following with you pulmonologist Dr. garcia that made aware for your admission. There was no indication to start you on new steroids as your exam was without any signs of emphysema exacerbation, and you have completed your Decadrone dose in the past for COVID infection. Please follow up with your pulmonologist within the next 2 weeks after your discharge.    Diagnosis: Hypertension  Assessment and Plan of Treatment: You have a known history of high blood pressure prior to your admission. To manage this you are on a medication called Isordil, this medication was held as your blood pressure was normal/ low due to your sepsis. High blood pressure can cause damage to your heart and kidneys and increases your risk of heart attack and stroke. To avoid this, It is important that you follow up with your primary care physician on a regular basis to make sure your blood pressure continues to be well controlled, please discuss with your PCP on the need to continue your blood pressure medication. If you experience symptoms such as but not limited to: sudden onset blurry vision, nausea, vomiting, chest pain, shortness of breath, or palpitations, please go to the nearest emergency room.

## 2021-04-12 NOTE — DISCHARGE NOTE PROVIDER - NSDCMRMEDTOKEN_GEN_ALL_CORE_FT
albuterol 90 mcg/inh inhalation aerosol: 1 puff(s) inhaled every 4 hours, As Needed   aspirin 81 mg oral tablet: 1 tab(s) orally once a day  benzonatate 100 mg oral capsule: 1 cap(s) orally 3 times a day, As needed, mild cough  guaifenesin-dextromethorphan 100 mg-10 mg/5 mL oral liquid: 10 milliliter(s) orally every 6 hours, As needed, severe cough  levoFLOXacin 750 mg oral tablet: 1 tab(s) orally once a day   Toprol-XL 25 mg oral tablet, extended release: 1 tab(s) orally once a day

## 2021-04-12 NOTE — PROGRESS NOTE ADULT - PROBLEM SELECTOR PLAN 6
pending cath once stable/ covid neg  chest pain is pleuritic in nature, EKG unchanged from prior, trops neg  low suspicion presentation is due to ACS  c/w toprol 25  per last discharge, not on statin due to fatty liver
pending cath once stable/ covid neg  chest pain is pleuritic in nature, EKG unchanged from prior, trops neg  low suspicion presentation is due to ACS  c/w toprol 25  per last discharge, not on statin due to fatty liver

## 2021-04-12 NOTE — CONSULT NOTE ADULT - ATTENDING COMMENTS
Patient seen and examined with house-staff during bedside rounds.  Resident note read, including vitals, physical findings, laboratory data, and radiological reports.   Revisions included below.  Direct personal management at bed side and extensive interpretation of the data.  Plan was outlined and discussed in details with the housestaff.  Decision making of high complexity  Action taken for acute disease activity to reflect the level of care provided:  - medication reconciliation  - review laboratory data  I discussed the case with Dr. Byrd.  I discussed the case with the fellow.  Patient was readmitted with hypoxemic respiratory failure post Covid infection.  I reviewed the CT scan.  Patient history obstructive sleep apnea and ILD.  At this point I agree with above and will hold on steroids as long as the patient is clinically improving.  I doubt the patient has superadded bacterial infection.  The C-reactive preteen were unremarkable.  The mortality of Covid patient with ILD is poor and  patient will need close observation and wean oxygen as tolerated

## 2021-04-12 NOTE — PROGRESS NOTE ADULT - SUBJECTIVE AND OBJECTIVE BOX
Medicine Progress Note    Patient is a 70y old  Female who presents with a chief complaint of AHRF due to Hap  (11 Apr 2021 19:40)      SUBJECTIVE / OVERNIGHT EVENTS:    ADDITIONAL REVIEW OF SYSTEMS:    MEDICATIONS  (STANDING):  ALBUTerol    90 MICROgram(s) HFA Inhaler 1 Puff(s) Inhalation every 4 hours  albuterol/ipratropium for Nebulization 3 milliLiter(s) Nebulizer every 4 hours  aspirin enteric coated 81 milliGRAM(s) Oral daily  enoxaparin Injectable 40 milliGRAM(s) SubCutaneous every 24 hours  lactated ringers. 1000 milliLiter(s) (100 mL/Hr) IV Continuous <Continuous>  magnesium sulfate  IVPB 2 Gram(s) IV Intermittent once  melatonin 5 milliGRAM(s) Oral at bedtime  piperacillin/tazobactam IVPB.. 4.5 Gram(s) IV Intermittent every 6 hours  polyethylene glycol 3350 17 Gram(s) Oral daily  senna 2 Tablet(s) Oral at bedtime  tiotropium 18 MICROgram(s) Capsule 1 Capsule(s) Inhalation daily    MEDICATIONS  (PRN):  acetaminophen   Tablet .. 650 milliGRAM(s) Oral every 6 hours PRN Temp greater or equal to 38C (100.4F), Mild Pain (1 - 3)  benzonatate 100 milliGRAM(s) Oral three times a day PRN mild cough  guaifenesin/dextromethorphan  Syrup 10 milliLiter(s) Oral every 6 hours PRN severe cough    CAPILLARY BLOOD GLUCOSE        I&O's Summary    12 Apr 2021 07:01  -  12 Apr 2021 14:27  --------------------------------------------------------  IN: 400 mL / OUT: 0 mL / NET: 400 mL        PHYSICAL EXAM:  Vital Signs Last 24 Hrs  T(C): 36.9 (12 Apr 2021 09:54), Max: 36.9 (11 Apr 2021 14:45)  T(F): 98.4 (12 Apr 2021 09:54), Max: 98.5 (11 Apr 2021 14:45)  HR: 70 (12 Apr 2021 08:55) (67 - 76)  BP: 111/73 (12 Apr 2021 08:55) (102/66 - 146/80)  BP(mean): --  RR: 16 (12 Apr 2021 08:55) (16 - 20)  SpO2: 95% (12 Apr 2021 08:55) (95% - 97%)  CONSTITUTIONAL: NAD, well-developed, well-groomed  ENMT: Moist oral mucosa, no pharyngeal injection or exudates; normal dentition  RESPIRATORY: Normal respiratory effort; lungs are clear to auscultation bilaterally  CARDIOVASCULAR: Regular rate and rhythm, normal S1 and S2, no murmur/rub/gallop; No lower extremity edema; Peripheral pulses are 2+ bilaterally  ABDOMEN: Nontender to palpation, normoactive bowel sounds, no rebound/guarding; No hepatosplenomegaly  PSYCH: A+O to person, place, and time; affect appropriate  NEUROLOGY: CN 2-12 are intact and symmetric; no gross sensory deficits   SKIN: No rashes; no palpable lesions    LABS:                        11.2   9.67  )-----------( 195      ( 12 Apr 2021 08:43 )             34.5     04-12    142  |  107  |  12  ----------------------------<  90  3.8   |  25  |  0.71    Ca    8.8      12 Apr 2021 08:43  Phos  4.2     04-12  Mg     1.8     04-12    TPro  6.0  /  Alb  3.0<L>  /  TBili  0.7  /  DBili  x   /  AST  26  /  ALT  35  /  AlkPhos  74  04-12              Culture - Urine (collected 09 Apr 2021 21:46)  Source: .Urine Clean Catch (Midstream)  Final Report (11 Apr 2021 11:13):    70,000 CFU/ml Streptococcus agalactiae (Group B)  Organism: Streptococcus agalactiae (Group B) (11 Apr 2021 11:13)  Organism: Streptococcus agalactiae (Group B) (11 Apr 2021 11:13)    Culture - Blood (collected 09 Apr 2021 20:45)  Source: .Blood Blood-Peripheral  Preliminary Report (11 Apr 2021 21:00):    No growth at 2 days.    Culture - Blood (collected 09 Apr 2021 20:45)  Source: .Blood Blood-Peripheral  Preliminary Report (11 Apr 2021 21:00):    No growth at 2 days.      COVID-19 PCR: Detected (09 Apr 2021 17:35)  SARS-CoV-2: Detected (16 Mar 2021 20:57)  COVID-19 PCR: Detected (16 Mar 2021 18:37)      RADIOLOGY & ADDITIONAL TESTS:  Imaging from Last 24 Hours:    Electrocardiogram/QTc Interval:    COORDINATION OF CARE:  Care Discussed with Consultants/Other Providers:   Medicine Progress Note    Patient is a 70y old  Female who presents with a chief complaint of AHRF due to Hap  (11 Apr 2021 19:40)      SUBJECTIVE / OVERNIGHT EVENTS: TOMAS. Patient has no new complaints, patient is refusing discharge today. Patient denies h/n/v/d, chills, cp, palpitations, abd pain, leg swelling, rashes, dysuria, and changes in BM.     MEDICATIONS  (STANDING):  ALBUTerol    90 MICROgram(s) HFA Inhaler 1 Puff(s) Inhalation every 4 hours  albuterol/ipratropium for Nebulization 3 milliLiter(s) Nebulizer every 4 hours  aspirin enteric coated 81 milliGRAM(s) Oral daily  enoxaparin Injectable 40 milliGRAM(s) SubCutaneous every 24 hours  lactated ringers. 1000 milliLiter(s) (100 mL/Hr) IV Continuous <Continuous>  magnesium sulfate  IVPB 2 Gram(s) IV Intermittent once  melatonin 5 milliGRAM(s) Oral at bedtime  piperacillin/tazobactam IVPB.. 4.5 Gram(s) IV Intermittent every 6 hours  polyethylene glycol 3350 17 Gram(s) Oral daily  senna 2 Tablet(s) Oral at bedtime  tiotropium 18 MICROgram(s) Capsule 1 Capsule(s) Inhalation daily    MEDICATIONS  (PRN):  acetaminophen   Tablet .. 650 milliGRAM(s) Oral every 6 hours PRN Temp greater or equal to 38C (100.4F), Mild Pain (1 - 3)  benzonatate 100 milliGRAM(s) Oral three times a day PRN mild cough  guaifenesin/dextromethorphan  Syrup 10 milliLiter(s) Oral every 6 hours PRN severe cough    CAPILLARY BLOOD GLUCOSE        I&O's Summary    12 Apr 2021 07:01  -  12 Apr 2021 14:27  --------------------------------------------------------  IN: 400 mL / OUT: 0 mL / NET: 400 mL        PHYSICAL EXAM:  Vital Signs Last 24 Hrs  T(C): 36.9 (12 Apr 2021 09:54), Max: 36.9 (11 Apr 2021 14:45)  T(F): 98.4 (12 Apr 2021 09:54), Max: 98.5 (11 Apr 2021 14:45)  HR: 70 (12 Apr 2021 08:55) (67 - 76)  BP: 111/73 (12 Apr 2021 08:55) (102/66 - 146/80)  BP(mean): --  RR: 16 (12 Apr 2021 08:55) (16 - 20)  SpO2: 95% (12 Apr 2021 08:55) (95% - 97%)  CONSTITUTIONAL: NAD, well-developed, well-groomed  ENMT: Moist oral mucosa, no pharyngeal injection or exudates; normal dentition  RESPIRATORY: Normal respiratory effort; lungs are clear to auscultation bilaterally  CARDIOVASCULAR: Regular rate and rhythm, normal S1 and S2, no murmur/rub/gallop; No lower extremity edema; Peripheral pulses are 2+ bilaterally  ABDOMEN: Nontender to palpation, normoactive bowel sounds, no rebound/guarding; No hepatosplenomegaly  PSYCH: A+O to person, place, and time; affect appropriate  NEUROLOGY: CN 2-12 are intact and symmetric; no gross sensory deficits   SKIN: No rashes; no palpable lesions    LABS:                        11.2   9.67  )-----------( 195      ( 12 Apr 2021 08:43 )             34.5     04-12    142  |  107  |  12  ----------------------------<  90  3.8   |  25  |  0.71    Ca    8.8      12 Apr 2021 08:43  Phos  4.2     04-12  Mg     1.8     04-12    TPro  6.0  /  Alb  3.0<L>  /  TBili  0.7  /  DBili  x   /  AST  26  /  ALT  35  /  AlkPhos  74  04-12              Culture - Urine (collected 09 Apr 2021 21:46)  Source: .Urine Clean Catch (Midstream)  Final Report (11 Apr 2021 11:13):    70,000 CFU/ml Streptococcus agalactiae (Group B)  Organism: Streptococcus agalactiae (Group B) (11 Apr 2021 11:13)  Organism: Streptococcus agalactiae (Group B) (11 Apr 2021 11:13)    Culture - Blood (collected 09 Apr 2021 20:45)  Source: .Blood Blood-Peripheral  Preliminary Report (11 Apr 2021 21:00):    No growth at 2 days.    Culture - Blood (collected 09 Apr 2021 20:45)  Source: .Blood Blood-Peripheral  Preliminary Report (11 Apr 2021 21:00):    No growth at 2 days.      COVID-19 PCR: Detected (09 Apr 2021 17:35)  SARS-CoV-2: Detected (16 Mar 2021 20:57)  COVID-19 PCR: Detected (16 Mar 2021 18:37)      RADIOLOGY & ADDITIONAL TESTS:  Imaging from Last 24 Hours:    Electrocardiogram/QTc Interval:    COORDINATION OF CARE:  Care Discussed with Consultants/Other Providers:

## 2021-04-12 NOTE — PROGRESS NOTE ADULT - PROBLEM SELECTOR PLAN 9
F: s/p 1L  N: dash/tlc  DVT: lovenox  PPI: start if steroids initiated  FULL CODE  DISPO: COVID REGIONAL
F: s/p 1L  N: dash/tlc  DVT: lovenox  PPI: start if steroids initiated  FULL CODE  DISPO: COVID REGIONAL

## 2021-04-13 ENCOUNTER — TRANSCRIPTION ENCOUNTER (OUTPATIENT)
Age: 71
End: 2021-04-13

## 2021-04-13 VITALS — TEMPERATURE: 98 F

## 2021-04-13 LAB
ALBUMIN SERPL ELPH-MCNC: 3.1 G/DL — LOW (ref 3.3–5)
ALP SERPL-CCNC: 73 U/L — SIGNIFICANT CHANGE UP (ref 40–120)
ALT FLD-CCNC: 35 U/L — SIGNIFICANT CHANGE UP (ref 10–45)
ANION GAP SERPL CALC-SCNC: 7 MMOL/L — SIGNIFICANT CHANGE UP (ref 5–17)
AST SERPL-CCNC: 28 U/L — SIGNIFICANT CHANGE UP (ref 10–40)
BASOPHILS # BLD AUTO: 0.03 K/UL — SIGNIFICANT CHANGE UP (ref 0–0.2)
BASOPHILS NFR BLD AUTO: 0.4 % — SIGNIFICANT CHANGE UP (ref 0–2)
BILIRUB SERPL-MCNC: 0.6 MG/DL — SIGNIFICANT CHANGE UP (ref 0.2–1.2)
BUN SERPL-MCNC: 11 MG/DL — SIGNIFICANT CHANGE UP (ref 7–23)
CALCIUM SERPL-MCNC: 8.8 MG/DL — SIGNIFICANT CHANGE UP (ref 8.4–10.5)
CHLORIDE SERPL-SCNC: 107 MMOL/L — SIGNIFICANT CHANGE UP (ref 96–108)
CO2 SERPL-SCNC: 26 MMOL/L — SIGNIFICANT CHANGE UP (ref 22–31)
CREAT SERPL-MCNC: 0.69 MG/DL — SIGNIFICANT CHANGE UP (ref 0.5–1.3)
CRP SERPL-MCNC: 9.8 MG/L — HIGH (ref 0–4)
D DIMER BLD IA.RAPID-MCNC: 226 NG/ML DDU — SIGNIFICANT CHANGE UP
EOSINOPHIL # BLD AUTO: 0.41 K/UL — SIGNIFICANT CHANGE UP (ref 0–0.5)
EOSINOPHIL NFR BLD AUTO: 4.9 % — SIGNIFICANT CHANGE UP (ref 0–6)
FERRITIN SERPL-MCNC: 413 NG/ML — HIGH (ref 15–150)
GLUCOSE SERPL-MCNC: 107 MG/DL — HIGH (ref 70–99)
HCT VFR BLD CALC: 35 % — SIGNIFICANT CHANGE UP (ref 34.5–45)
HGB BLD-MCNC: 11.1 G/DL — LOW (ref 11.5–15.5)
IMM GRANULOCYTES NFR BLD AUTO: 0.2 % — SIGNIFICANT CHANGE UP (ref 0–1.5)
LACTATE SERPL-SCNC: 1.5 MMOL/L — SIGNIFICANT CHANGE UP (ref 0.5–2)
LYMPHOCYTES # BLD AUTO: 2.93 K/UL — SIGNIFICANT CHANGE UP (ref 1–3.3)
LYMPHOCYTES # BLD AUTO: 35.3 % — SIGNIFICANT CHANGE UP (ref 13–44)
MAGNESIUM SERPL-MCNC: 2 MG/DL — SIGNIFICANT CHANGE UP (ref 1.6–2.6)
MCHC RBC-ENTMCNC: 31.7 GM/DL — LOW (ref 32–36)
MCHC RBC-ENTMCNC: 33.3 PG — SIGNIFICANT CHANGE UP (ref 27–34)
MCV RBC AUTO: 105.1 FL — HIGH (ref 80–100)
MONOCYTES # BLD AUTO: 0.66 K/UL — SIGNIFICANT CHANGE UP (ref 0–0.9)
MONOCYTES NFR BLD AUTO: 8 % — SIGNIFICANT CHANGE UP (ref 2–14)
NEUTROPHILS # BLD AUTO: 4.24 K/UL — SIGNIFICANT CHANGE UP (ref 1.8–7.4)
NEUTROPHILS NFR BLD AUTO: 51.2 % — SIGNIFICANT CHANGE UP (ref 43–77)
NRBC # BLD: 0 /100 WBCS — SIGNIFICANT CHANGE UP (ref 0–0)
PHOSPHATE SERPL-MCNC: 3.8 MG/DL — SIGNIFICANT CHANGE UP (ref 2.5–4.5)
PLATELET # BLD AUTO: 202 K/UL — SIGNIFICANT CHANGE UP (ref 150–400)
POTASSIUM SERPL-MCNC: 4.3 MMOL/L — SIGNIFICANT CHANGE UP (ref 3.5–5.3)
POTASSIUM SERPL-SCNC: 4.3 MMOL/L — SIGNIFICANT CHANGE UP (ref 3.5–5.3)
PROT SERPL-MCNC: 6 G/DL — SIGNIFICANT CHANGE UP (ref 6–8.3)
RBC # BLD: 3.33 M/UL — LOW (ref 3.8–5.2)
RBC # FLD: 12.9 % — SIGNIFICANT CHANGE UP (ref 10.3–14.5)
SARS-COV-2 RNA SPEC QL NAA+PROBE: SIGNIFICANT CHANGE UP
SODIUM SERPL-SCNC: 140 MMOL/L — SIGNIFICANT CHANGE UP (ref 135–145)
VANCOMYCIN TROUGH SERPL-MCNC: 8.2 UG/ML — LOW (ref 10–20)
WBC # BLD: 8.29 K/UL — SIGNIFICANT CHANGE UP (ref 3.8–10.5)
WBC # FLD AUTO: 8.29 K/UL — SIGNIFICANT CHANGE UP (ref 3.8–10.5)

## 2021-04-13 PROCEDURE — 80202 ASSAY OF VANCOMYCIN: CPT

## 2021-04-13 PROCEDURE — 87184 SC STD DISK METHOD PER PLATE: CPT

## 2021-04-13 PROCEDURE — U0005: CPT

## 2021-04-13 PROCEDURE — 85730 THROMBOPLASTIN TIME PARTIAL: CPT

## 2021-04-13 PROCEDURE — 86769 SARS-COV-2 COVID-19 ANTIBODY: CPT

## 2021-04-13 PROCEDURE — 84484 ASSAY OF TROPONIN QUANT: CPT

## 2021-04-13 PROCEDURE — 83615 LACTATE (LD) (LDH) ENZYME: CPT

## 2021-04-13 PROCEDURE — 71045 X-RAY EXAM CHEST 1 VIEW: CPT

## 2021-04-13 PROCEDURE — 71275 CT ANGIOGRAPHY CHEST: CPT

## 2021-04-13 PROCEDURE — 87641 MR-STAPH DNA AMP PROBE: CPT

## 2021-04-13 PROCEDURE — 82803 BLOOD GASES ANY COMBINATION: CPT

## 2021-04-13 PROCEDURE — 94640 AIRWAY INHALATION TREATMENT: CPT

## 2021-04-13 PROCEDURE — 96374 THER/PROPH/DIAG INJ IV PUSH: CPT

## 2021-04-13 PROCEDURE — 82550 ASSAY OF CK (CPK): CPT

## 2021-04-13 PROCEDURE — 83880 ASSAY OF NATRIURETIC PEPTIDE: CPT

## 2021-04-13 PROCEDURE — 82962 GLUCOSE BLOOD TEST: CPT

## 2021-04-13 PROCEDURE — 85379 FIBRIN DEGRADATION QUANT: CPT

## 2021-04-13 PROCEDURE — 81001 URINALYSIS AUTO W/SCOPE: CPT

## 2021-04-13 PROCEDURE — 84295 ASSAY OF SERUM SODIUM: CPT

## 2021-04-13 PROCEDURE — 82330 ASSAY OF CALCIUM: CPT

## 2021-04-13 PROCEDURE — 82728 ASSAY OF FERRITIN: CPT

## 2021-04-13 PROCEDURE — U0003: CPT

## 2021-04-13 PROCEDURE — 85384 FIBRINOGEN ACTIVITY: CPT

## 2021-04-13 PROCEDURE — 84132 ASSAY OF SERUM POTASSIUM: CPT

## 2021-04-13 PROCEDURE — 86140 C-REACTIVE PROTEIN: CPT

## 2021-04-13 PROCEDURE — 83605 ASSAY OF LACTIC ACID: CPT

## 2021-04-13 PROCEDURE — 85025 COMPLETE CBC W/AUTO DIFF WBC: CPT

## 2021-04-13 PROCEDURE — 87640 STAPH A DNA AMP PROBE: CPT

## 2021-04-13 PROCEDURE — 87040 BLOOD CULTURE FOR BACTERIA: CPT

## 2021-04-13 PROCEDURE — 99238 HOSP IP/OBS DSCHRG MGMT 30/<: CPT | Mod: CS

## 2021-04-13 PROCEDURE — 84100 ASSAY OF PHOSPHORUS: CPT

## 2021-04-13 PROCEDURE — 80053 COMPREHEN METABOLIC PANEL: CPT

## 2021-04-13 PROCEDURE — 99285 EMERGENCY DEPT VISIT HI MDM: CPT | Mod: 25

## 2021-04-13 PROCEDURE — 83735 ASSAY OF MAGNESIUM: CPT

## 2021-04-13 PROCEDURE — 84145 PROCALCITONIN (PCT): CPT

## 2021-04-13 PROCEDURE — 85610 PROTHROMBIN TIME: CPT

## 2021-04-13 PROCEDURE — 36415 COLL VENOUS BLD VENIPUNCTURE: CPT

## 2021-04-13 PROCEDURE — 87086 URINE CULTURE/COLONY COUNT: CPT

## 2021-04-13 PROCEDURE — 96375 TX/PRO/DX INJ NEW DRUG ADDON: CPT

## 2021-04-13 RX ORDER — CIPROFLOXACIN LACTATE 400MG/40ML
1 VIAL (ML) INTRAVENOUS
Qty: 3 | Refills: 0
Start: 2021-04-13 | End: 2021-04-15

## 2021-04-13 RX ORDER — GUAIFENESIN/DEXTROMETHORPHAN 600MG-30MG
10 TABLET, EXTENDED RELEASE 12 HR ORAL
Qty: 1 | Refills: 0
Start: 2021-04-13 | End: 2021-04-19

## 2021-04-13 RX ADMIN — Medication 0.5 MILLIGRAM(S): at 08:23

## 2021-04-13 RX ADMIN — PIPERACILLIN AND TAZOBACTAM 200 GRAM(S): 4; .5 INJECTION, POWDER, LYOPHILIZED, FOR SOLUTION INTRAVENOUS at 05:09

## 2021-04-13 RX ADMIN — ENOXAPARIN SODIUM 40 MILLIGRAM(S): 100 INJECTION SUBCUTANEOUS at 05:09

## 2021-04-13 RX ADMIN — Medication 3 MILLILITER(S): at 12:14

## 2021-04-13 RX ADMIN — Medication 3 MILLILITER(S): at 09:33

## 2021-04-13 RX ADMIN — PIPERACILLIN AND TAZOBACTAM 200 GRAM(S): 4; .5 INJECTION, POWDER, LYOPHILIZED, FOR SOLUTION INTRAVENOUS at 11:45

## 2021-04-13 RX ADMIN — Medication 3 MILLILITER(S): at 05:09

## 2021-04-13 RX ADMIN — Medication 81 MILLIGRAM(S): at 11:46

## 2021-04-13 NOTE — DIETITIAN INITIAL EVALUATION ADULT. - HEIGHT FOR BMI (INCHES)
Problem: Respiratory Impairment - Respiratory Therapy 253  Intervention: Inhaled medication delivery  Intervention Status  Done         3

## 2021-04-13 NOTE — DIETITIAN INITIAL EVALUATION ADULT. - ADD RECOMMEND
1. Con.t DASH?TLC diet 2. Monitor PO intake 3. Monitor BM. BM regimen per team 4. Trend wts 5. Monitor labs: BMP, lytes, replete PRN

## 2021-04-13 NOTE — PROGRESS NOTE ADULT - SUBJECTIVE AND OBJECTIVE BOX
Medicine Progress Note    Patient is a 70y old  Female who presents with a chief complaint of AHRF (13 Apr 2021 13:55)      SUBJECTIVE / OVERNIGHT EVENTS:    ADDITIONAL REVIEW OF SYSTEMS:    MEDICATIONS  (STANDING):  ALBUTerol    90 MICROgram(s) HFA Inhaler 1 Puff(s) Inhalation every 4 hours  albuterol/ipratropium for Nebulization 3 milliLiter(s) Nebulizer every 4 hours  aspirin enteric coated 81 milliGRAM(s) Oral daily  enoxaparin Injectable 40 milliGRAM(s) SubCutaneous every 24 hours  melatonin 5 milliGRAM(s) Oral at bedtime  piperacillin/tazobactam IVPB.. 4.5 Gram(s) IV Intermittent every 6 hours  polyethylene glycol 3350 17 Gram(s) Oral daily  senna 2 Tablet(s) Oral at bedtime  tiotropium 18 MICROgram(s) Capsule 1 Capsule(s) Inhalation daily    MEDICATIONS  (PRN):  acetaminophen   Tablet .. 650 milliGRAM(s) Oral every 6 hours PRN Temp greater or equal to 38C (100.4F), Mild Pain (1 - 3)  benzonatate 100 milliGRAM(s) Oral three times a day PRN mild cough  guaifenesin/dextromethorphan  Syrup 10 milliLiter(s) Oral every 6 hours PRN severe cough    CAPILLARY BLOOD GLUCOSE        I&O's Summary    12 Apr 2021 07:01  -  13 Apr 2021 07:00  --------------------------------------------------------  IN: 840 mL / OUT: 850 mL / NET: -10 mL        PHYSICAL EXAM:  Vital Signs Last 24 Hrs  T(C): 37.5 (13 Apr 2021 13:19), Max: 37.5 (13 Apr 2021 13:19)  T(F): 99.5 (13 Apr 2021 13:19), Max: 99.5 (13 Apr 2021 13:19)  HR: 78 (13 Apr 2021 12:58) (60 - 91)  BP: 114/71 (13 Apr 2021 12:58) (112/64 - 145/89)  BP(mean): --  RR: 20 (13 Apr 2021 12:58) (17 - 20)  SpO2: 95% (13 Apr 2021 12:58) (93% - 97%)  CONSTITUTIONAL: NAD, well-developed, well-groomed  ENMT: Moist oral mucosa, no pharyngeal injection or exudates; normal dentition  RESPIRATORY: Normal respiratory effort; lungs are clear to auscultation bilaterally  CARDIOVASCULAR: Regular rate and rhythm, normal S1 and S2, no murmur/rub/gallop; No lower extremity edema; Peripheral pulses are 2+ bilaterally  ABDOMEN: Nontender to palpation, normoactive bowel sounds, no rebound/guarding; No hepatosplenomegaly  PSYCH: A+O to person, place, and time; affect appropriate  NEUROLOGY: CN 2-12 are intact and symmetric; no gross sensory deficits   SKIN: No rashes; no palpable lesions    LABS:                        11.1   8.29  )-----------( 202      ( 13 Apr 2021 06:20 )             35.0     04-13    140  |  107  |  11  ----------------------------<  107<H>  4.3   |  26  |  0.69    Ca    8.8      13 Apr 2021 06:20  Phos  3.8     04-13  Mg     2.0     04-13    TPro  6.0  /  Alb  3.1<L>  /  TBili  0.6  /  DBili  x   /  AST  28  /  ALT  35  /  AlkPhos  73  04-13              COVID-19 PCR: Detected (09 Apr 2021 17:35)  SARS-CoV-2: Detected (16 Mar 2021 20:57)  COVID-19 PCR: Detected (16 Mar 2021 18:37)      RADIOLOGY & ADDITIONAL TESTS:  Imaging from Last 24 Hours:    Electrocardiogram/QTc Interval:    COORDINATION OF CARE:  Care Discussed with Consultants/Other Providers:

## 2021-04-13 NOTE — DISCHARGE NOTE NURSING/CASE MANAGEMENT/SOCIAL WORK - NSDCFUADDAPPT_GEN_ALL_CORE_FT
Please follow up with your primary care provider Dr. Park. Please call to his office to make an appointment within the next 2 weeks from this discharge. [phone]    Please follow up with your Pulmonologist Dr. Byrd on this Friday 4/16 at 11:30AM virtual appointment. Please call the office with any question you might have [phone]

## 2021-04-13 NOTE — DISCHARGE NOTE NURSING/CASE MANAGEMENT/SOCIAL WORK - PATIENT PORTAL LINK FT
You can access the FollowMyHealth Patient Portal offered by [facility] by registering at the following website: [email or web address]. By joining [employer]’s FollowMyHealth portal, you will also be able to view your health information using other applications (apps) compatible with our system.

## 2021-04-13 NOTE — DIETITIAN INITIAL EVALUATION ADULT. - OTHER CALCULATIONS
IBW used to calculate energy needs due to pt's current body weight exceeding 120% of IBW (139%). Needs adjusted for age, and hypermetabolic state 2/2 viral infection.   Fluids per team d/t covid.

## 2021-04-13 NOTE — DIETITIAN INITIAL EVALUATION ADULT. - OTHER INFO
69 y/o female, former smoker, h/o HTN, VINCE (does not use CPAP), pulmonary fibrosis stage I diastolic HfpEF (EF 75%) pending elective cardiac cath positive NST (Feb 2021), recent admission last month for COVID-19  presented with worsening cough and admitted for Sepsis due to Cap and started on antibiotics.    Attempted to speak to pt in room, seen sleeping in bed and not arousable, unable to obtain diet hx at this time. Spoke to RN, reports that pt had good appetite, usual intake of about 50-75% during meal times. Ate most of breakfast this AM. Pt with no chewing/swallowing difficulties, and denies any pain. No n/v/d/c, last BM 4/12. Judah 21, pressure yin intact.

## 2021-04-13 NOTE — DIETITIAN INITIAL EVALUATION ADULT. - PROBLEM SELECTOR PLAN 2
no obvious focal finding on imaging however worsening cough, fever, mildly elevated procal  with risk factors for mrsa/psuedomonas given recent hospitalization and bronchiectasis ---> will continue with vanc/zosyn for CAP w/ risk factors [not hap]  trend procal

## 2021-04-13 NOTE — PROGRESS NOTE ADULT - ASSESSMENT
69 y/o female, former smoker, h/o HTN, VINCE (does not use CPAP), pulmonary fibrosis stage I diastolic HfpEF (EF 75%) pending elective cardiac cath positive NST (Feb 2021), recent admission last month for COVID-19  presented with worsening cough and admitted for Sepsis due to Cap and started on antibiotics  and is better. Pt denies any chest pain and trop and ekg unremarkable. Pulmonary consulted and discharge planning in progress with home O2 .   Pt also has ? anxiety which resolved with ativan. She wasnt drowsy and with the low dose ativan and resp distress is better.    Assesment  Sepsis due to Hap  ? anxiety  Acute hypoxic resp failure suspect Hap/pcp/with underlying emphysema/ILD  h/o covid( Rx with remdesevir and dexamethasone)  Emphysema  ILD  CAD with + stress test    Plan  MRSA swab neg  Pt has multiple causes for her acute worsening resp status.   ? HAP on underlying emphysema/Interstitial fibrosis from previous 911 exposure  Pulm consult appreciated  acute condition has resolved  f/u with pulm for pfts and bronch  D dimer was neg and CT chest neg for pE on admission    Pt also needs cardiac cath after resp status is better unless urgent  Monitor for cardiac complaints    Needs psych eval for anxiety  advised about risks of benzos in setting of resp distress and pt expressed understanding    Taper o2 as tolerated  resting/ambulating spo2  PT eval and discharge planning with Home o2  Plan discussed with patient.  
70F former smoker, with PMH HTN, VINCE, pulmonary fibrosis, HFpEF, ischemic heart disease, recent admission for COVID-19 discharged on O2 who re-presents due to worsening cough, hemoptysis, pleuritic chest pain possibly due to bacterial infection versus worsening of pulmonary fibrosis due to recent covid.   
70F former smoker, with PMH HTN, VINCE, pulmonary fibrosis, HFpEF, ischemic heart disease, recent admission for COVID-19 discharged on O2 who re-presents due to worsening cough, hemoptysis, pleuritic chest pain possibly due to bacterial infection versus worsening of pulmonary fibrosis due to recent covid.

## 2021-04-13 NOTE — DIETITIAN INITIAL EVALUATION ADULT. - REASON
Unable to conduct nutrition-focused physical exam due to contact restrictions related to the pt's medical condition and isolation precautions.

## 2021-04-16 DIAGNOSIS — A41.9 SEPSIS, UNSPECIFIED ORGANISM: ICD-10-CM

## 2021-04-16 DIAGNOSIS — U07.1 COVID-19: ICD-10-CM

## 2021-04-16 DIAGNOSIS — J43.9 EMPHYSEMA, UNSPECIFIED: ICD-10-CM

## 2021-04-16 DIAGNOSIS — E87.2 ACIDOSIS: ICD-10-CM

## 2021-04-16 DIAGNOSIS — I11.0 HYPERTENSIVE HEART DISEASE WITH HEART FAILURE: ICD-10-CM

## 2021-04-16 DIAGNOSIS — Z87.891 PERSONAL HISTORY OF NICOTINE DEPENDENCE: ICD-10-CM

## 2021-04-16 DIAGNOSIS — J15.9 UNSPECIFIED BACTERIAL PNEUMONIA: ICD-10-CM

## 2021-04-16 DIAGNOSIS — I25.10 ATHEROSCLEROTIC HEART DISEASE OF NATIVE CORONARY ARTERY WITHOUT ANGINA PECTORIS: ICD-10-CM

## 2021-04-16 DIAGNOSIS — J96.01 ACUTE RESPIRATORY FAILURE WITH HYPOXIA: ICD-10-CM

## 2021-04-16 DIAGNOSIS — G47.33 OBSTRUCTIVE SLEEP APNEA (ADULT) (PEDIATRIC): ICD-10-CM

## 2021-04-16 DIAGNOSIS — I50.32 CHRONIC DIASTOLIC (CONGESTIVE) HEART FAILURE: ICD-10-CM

## 2021-04-16 DIAGNOSIS — J84.10 PULMONARY FIBROSIS, UNSPECIFIED: ICD-10-CM

## 2021-05-20 PROBLEM — U07.1 COVID-19: Chronic | Status: ACTIVE | Noted: 2021-04-09

## 2021-05-20 PROBLEM — J84.10 PULMONARY FIBROSIS, UNSPECIFIED: Chronic | Status: ACTIVE | Noted: 2021-04-09

## 2021-05-26 NOTE — ED PROVIDER NOTE - PROGRESS NOTE DETAILS
Addended by: ALLYSON ROSE on: 5/26/2021 08:15 AM     Modules accepted: Orders    
ruled out PE, EKG no ischemic changes,   improvement w nebs and steroids, persistent COVID pna,   suspect lactic acidosis secondary to hypoxic episode which occurred as oxygen ran out enroute,   discussed w Dr. Park pt's pcp.

## 2021-06-01 ENCOUNTER — APPOINTMENT (OUTPATIENT)
Dept: CT IMAGING | Facility: HOSPITAL | Age: 71
End: 2021-06-01

## 2021-06-01 ENCOUNTER — OUTPATIENT (OUTPATIENT)
Dept: OUTPATIENT SERVICES | Facility: HOSPITAL | Age: 71
LOS: 1 days | End: 2021-06-01
Payer: MEDICARE

## 2021-06-01 ENCOUNTER — EMERGENCY (EMERGENCY)
Facility: HOSPITAL | Age: 71
LOS: 1 days | Discharge: HOME | End: 2021-06-01
Admitting: EMERGENCY MEDICINE
Payer: MEDICARE

## 2021-06-01 VITALS
HEART RATE: 68 BPM | RESPIRATION RATE: 18 BRPM | OXYGEN SATURATION: 98 % | DIASTOLIC BLOOD PRESSURE: 67 MMHG | SYSTOLIC BLOOD PRESSURE: 115 MMHG | TEMPERATURE: 98 F

## 2021-06-01 VITALS
DIASTOLIC BLOOD PRESSURE: 65 MMHG | HEIGHT: 63 IN | TEMPERATURE: 98 F | WEIGHT: 160.06 LBS | SYSTOLIC BLOOD PRESSURE: 114 MMHG | OXYGEN SATURATION: 97 % | RESPIRATION RATE: 20 BRPM | HEART RATE: 71 BPM

## 2021-06-01 DIAGNOSIS — R09.02 HYPOXEMIA: ICD-10-CM

## 2021-06-01 DIAGNOSIS — I50.9 HEART FAILURE, UNSPECIFIED: ICD-10-CM

## 2021-06-01 DIAGNOSIS — Z87.09 PERSONAL HISTORY OF OTHER DISEASES OF THE RESPIRATORY SYSTEM: ICD-10-CM

## 2021-06-01 DIAGNOSIS — Z86.19 PERSONAL HISTORY OF OTHER INFECTIOUS AND PARASITIC DISEASES: ICD-10-CM

## 2021-06-01 DIAGNOSIS — G47.33 OBSTRUCTIVE SLEEP APNEA (ADULT) (PEDIATRIC): ICD-10-CM

## 2021-06-01 DIAGNOSIS — I11.0 HYPERTENSIVE HEART DISEASE WITH HEART FAILURE: ICD-10-CM

## 2021-06-01 DIAGNOSIS — Z79.82 LONG TERM (CURRENT) USE OF ASPIRIN: ICD-10-CM

## 2021-06-01 PROCEDURE — 71250 CT THORAX DX C-: CPT

## 2021-06-01 PROCEDURE — 99282 EMERGENCY DEPT VISIT SF MDM: CPT

## 2021-06-01 PROCEDURE — 71250 CT THORAX DX C-: CPT | Mod: 26,MH

## 2021-06-01 NOTE — ED ADULT NURSE NOTE - OBJECTIVE STATEMENT
pt is a 70y female c/o losing a piece to her oxygen tank. hx of covid in March. pt has been on oxygen since discharge in March due to "scarred lung tissue." pt states, "I was on my way to [place] today and a piece of my oxygen tank broke so I haven't had oxygen in a few hours. my doctor told me to come to the ED to get more oxygen." hx of hypertension. pt presents with dry cough. pt has tested negative for covid 2x since discharge. pt denies fever, chills, CP, n/v/d, numbness, tingling, urinary sx. Alert and oriented x 4. ambulates in ED w/ steady gait. speaking in full, clear sentences. pt is a 70y female c/o losing a piece to her oxygen tank. hx of covid in March. pt has been on oxygen since discharge in March due to "scarred lung tissue." pt states, "I was on my way to [place] today and a piece of my oxygen tank broke so I haven't had oxygen in a few hours. my doctor told me to come to the ED to get more oxygen." hx of hypertension. pt presents with dry cough. pt has tested negative for covid 2x since discharge. pt denies fever, chills, CP, n/v/d, numbness, tingling, urinary sx. Alert and oriented x 4. ambulates in ED w/ steady gait. speaking in full, clear sentences. pt on 3L NC, 02 sat 97%.

## 2021-06-01 NOTE — ED PROVIDER NOTE - PHYSICAL EXAMINATION
Vitals reviewed  Gen: well appearing, nad, speaking in full sentences, O2 NC in place, no hypoxia   Skin: wwp, no rash/lesions  HEENT: ncat, eomi, mmm  CV: rrr, no audible m/r/g  Resp: symmetrical expansion, ctab, no w/r/r  Ext: FROM throughout, no peripheral edema  Neuro: alert/oriented, no focal deficits, steady gait

## 2021-06-01 NOTE — ED ADULT NURSE NOTE - PMH
Chronic diastolic congestive heart failure    Hypertension    Ischemic heart disease    Obstructive sleep apnea    Pneumonia due to COVID-19 virus    Pulmonary fibrosis

## 2021-06-01 NOTE — ED ADULT TRIAGE NOTE - CHIEF COMPLAINT QUOTE
Pt had an outpatient CT scan today and ran out of her oxygen tank afterwards. Pt states she has no more oxygen at home. Denies current symptoms. Dx with COVID-19 8 weeks ago.

## 2021-06-01 NOTE — ED PROVIDER NOTE - CLINICAL SUMMARY MEDICAL DECISION MAKING FREE TEXT BOX
70F former smoker, with PMH HTN, VINCE, pulmonary fibrosis, HFpEF, ischemic heart disease, recent admissions for COVID-19 and sepsis 2/2 bacterial pneumonia, dc on home O2 presents requesting new oxygen machine as hers broke this morning.  CM/SW obtained new machine for pt and dc in stable condition.

## 2021-06-01 NOTE — ED PROVIDER NOTE - PATIENT PORTAL LINK FT
You can access the FollowMyHealth Patient Portal offered by [facility] by registering at the following website: [email or web address]. By joining "[employer]’s FollowMyHealth portal, you will also be able to view your health information using other applications (apps) compatible with our system.

## 2021-06-03 PROCEDURE — 82803 BLOOD GASES ANY COMBINATION: CPT

## 2021-06-03 PROCEDURE — 82962 GLUCOSE BLOOD TEST: CPT

## 2021-06-03 PROCEDURE — 83880 ASSAY OF NATRIURETIC PEPTIDE: CPT

## 2021-06-03 PROCEDURE — 83735 ASSAY OF MAGNESIUM: CPT

## 2021-06-03 PROCEDURE — 96375 TX/PRO/DX INJ NEW DRUG ADDON: CPT

## 2021-06-03 PROCEDURE — 85025 COMPLETE CBC W/AUTO DIFF WBC: CPT

## 2021-06-03 PROCEDURE — 93321 DOPPLER ECHO F-UP/LMTD STD: CPT

## 2021-06-03 PROCEDURE — 80053 COMPREHEN METABOLIC PANEL: CPT

## 2021-06-03 PROCEDURE — 83605 ASSAY OF LACTIC ACID: CPT

## 2021-06-03 PROCEDURE — 84484 ASSAY OF TROPONIN QUANT: CPT

## 2021-06-03 PROCEDURE — 87389 HIV-1 AG W/HIV-1&-2 AB AG IA: CPT

## 2021-06-03 PROCEDURE — 84145 PROCALCITONIN (PCT): CPT

## 2021-06-03 PROCEDURE — 85652 RBC SED RATE AUTOMATED: CPT

## 2021-06-03 PROCEDURE — U0005: CPT

## 2021-06-03 PROCEDURE — 71045 X-RAY EXAM CHEST 1 VIEW: CPT

## 2021-06-03 PROCEDURE — 86140 C-REACTIVE PROTEIN: CPT

## 2021-06-03 PROCEDURE — 85610 PROTHROMBIN TIME: CPT

## 2021-06-03 PROCEDURE — 84100 ASSAY OF PHOSPHORUS: CPT

## 2021-06-03 PROCEDURE — 83615 LACTATE (LD) (LDH) ENZYME: CPT

## 2021-06-03 PROCEDURE — 87040 BLOOD CULTURE FOR BACTERIA: CPT

## 2021-06-03 PROCEDURE — 82728 ASSAY OF FERRITIN: CPT

## 2021-06-03 PROCEDURE — 83036 HEMOGLOBIN GLYCOSYLATED A1C: CPT

## 2021-06-03 PROCEDURE — 85730 THROMBOPLASTIN TIME PARTIAL: CPT

## 2021-06-03 PROCEDURE — 80061 LIPID PANEL: CPT

## 2021-06-03 PROCEDURE — 85027 COMPLETE CBC AUTOMATED: CPT

## 2021-06-03 PROCEDURE — 36415 COLL VENOUS BLD VENIPUNCTURE: CPT

## 2021-06-03 PROCEDURE — 84295 ASSAY OF SERUM SODIUM: CPT

## 2021-06-03 PROCEDURE — 82553 CREATINE MB FRACTION: CPT

## 2021-06-03 PROCEDURE — 84132 ASSAY OF SERUM POTASSIUM: CPT

## 2021-06-03 PROCEDURE — 85379 FIBRIN DEGRADATION QUANT: CPT

## 2021-06-03 PROCEDURE — 86803 HEPATITIS C AB TEST: CPT

## 2021-06-03 PROCEDURE — U0003: CPT

## 2021-06-03 PROCEDURE — M0243: CPT

## 2021-06-03 PROCEDURE — 84443 ASSAY THYROID STIM HORMONE: CPT

## 2021-06-03 PROCEDURE — 82330 ASSAY OF CALCIUM: CPT

## 2021-06-03 PROCEDURE — 82550 ASSAY OF CK (CPK): CPT

## 2021-06-03 PROCEDURE — 0225U NFCT DS DNA&RNA 21 SARSCOV2: CPT

## 2021-06-03 PROCEDURE — 99285 EMERGENCY DEPT VISIT HI MDM: CPT | Mod: 25

## 2021-06-16 VITALS
TEMPERATURE: 97 F | SYSTOLIC BLOOD PRESSURE: 108 MMHG | RESPIRATION RATE: 16 BRPM | WEIGHT: 164.91 LBS | OXYGEN SATURATION: 99 % | HEIGHT: 63 IN | HEART RATE: 57 BPM | DIASTOLIC BLOOD PRESSURE: 64 MMHG

## 2021-06-16 RX ORDER — CHLORHEXIDINE GLUCONATE 213 G/1000ML
1 SOLUTION TOPICAL ONCE
Refills: 0 | Status: DISCONTINUED | OUTPATIENT
Start: 2021-06-21 | End: 2021-07-05

## 2021-06-16 NOTE — H&P ADULT - NSICDXPASTMEDICALHX_GEN_ALL_CORE_FT
PAST MEDICAL HISTORY:  Chronic diastolic congestive heart failure     Hypertension     Ischemic heart disease     Obstructive sleep apnea     Pneumonia due to COVID-19 virus     Pulmonary fibrosis      PAST MEDICAL HISTORY:  Anxiety     Hypertension     Obstructive sleep apnea     Pneumonia due to COVID-19 virus     Pulmonary fibrosis

## 2021-06-16 NOTE — H&P ADULT - NSHPLABSRESULTS_GEN_ALL_CORE
12.0   9.59  )-----------( 186      ( 21 Jun 2021 12:26 )             37.4       06-21    150<H>  |  113<H>  |  20  ----------------------------<  90  3.9   |  25  |  0.75    Ca    9.4      21 Jun 2021 12:26    TPro  6.9  /  Alb  4.2  /  TBili  0.4  /  DBili  0.2  /  AST  24  /  ALT  20  /  AlkPhos  67  06-21      PT/INR - ( 21 Jun 2021 12:26 )   PT: 12.5 sec;   INR: 1.04          PTT - ( 21 Jun 2021 12:26 )  PTT:29.7 sec        EKG: sinus cintia 57bpm, RBBB, anterolateral TWI

## 2021-06-16 NOTE — H&P ADULT - NSHPSOCIALHISTORY_GEN_ALL_CORE
former smoker quit 1980; 5 cig /day X 1 year   denies any illict drug use  drinks ETOH socially former smoker quit 1980; 5 cig /day X 2 years   denies any illict drug use  drinks ETOH socially

## 2021-06-16 NOTE — H&P ADULT - HISTORY OF PRESENT ILLNESS
Cardiologist: Dr. Esposito  COVID: Plan to get COVID testing 6/18/21 (will call hotline), no COVID vaccination yet  Escort: sister  Pharmacy: [facility],  [place], [place]   CONFIRM MEDS !!    Last period 20 years ago    71 y/o F former smoker with PMH of HTN, COPD with chronic bronchitis, VINCE (not on CPAP at home)  , pulmonary fibrosis likely 2/2 911 exposure (not on home O2 pre-covid), stage I diastolic HfpEF (EF 75%) , COVID-19 03/2021 (got 3 doses of remdesevir, discharged on home O2, decadron; course c/b sinus bradycardia felt by EP to be 2/2 covid; toprol d/c at that time) with readmission on 04/2021 for possibly due to bacterial infection versus worsening of pulmonary fibrosis due to recent covid; pt was d/c on steroid taper and Antibiotics. Pt was initially planned for  ambi cardiac cath 03/2021 (abnormal NST 02/2021)  which was deferred 2/2 COVID; pt now presents to see her  cardiologist Dr. Esposito and continues to endorse FAROOQ which has been ongoing for the past 8 months. Pt has SOB on ambulating less than 1 block which is worse with exertion and relieved with rest.  Pt, denies any chest pain at rest or exertion, denies dizziness, palpitation, N/V, LE erdema, PND/orthopnea, syncope, abdominal pain, loss of taste, fever/chills.  NST 2/15/21 revealed mild anteroapical and anteroseptal ischemia, normal biventricular size and function. ECHO 1/2021 revealed normal EF 65 % with moderate LVH, mild AS and mild MR.  In light of patients risk factors, CCS class anginal equivalent sx, abnormal NST; pt is now referred for R + L cardiac cath with possible intervention.        Cardiologist: Dr. Esposito  COVID: no COVID vaccination yet, COVID PCR 6/18/21 @ [facility], negative (patient to bring results)   Escort: sister  Pharmacy: [facility],  [place], [place]   CONFIRM MEDS !!    Last period 20 years ago    71 y/o F former smoker with PMH of HTN, COPD with chronic bronchitis, VINCE (not on CPAP at home)  , pulmonary fibrosis likely 2/2 911 exposure (not on home O2 pre-covid), stage I diastolic HfpEF (EF 75%) , COVID-19 03/2021 (got 3 doses of remdesevir, discharged on home O2, decadron; course c/b sinus bradycardia felt by EP to be 2/2 covid; toprol d/c at that time) with readmission on 04/2021 for possibly due to bacterial infection versus worsening of pulmonary fibrosis due to recent covid; pt was d/c on steroid taper and Antibiotics. Pt was initially planned for  ambi cardiac cath 03/2021 (abnormal NST 02/2021)  which was deferred 2/2 COVID; pt now presents to see her  cardiologist Dr. Esposito and continues to endorse FAROOQ which has been ongoing for the past 8 months. Pt has SOB on ambulating less than 1 block which is worse with exertion and relieved with rest.  Pt, denies any chest pain at rest or exertion, denies dizziness, palpitation, N/V, LE erdema, PND/orthopnea, syncope, abdominal pain, loss of taste, fever/chills.  NST 2/15/21 revealed mild anteroapical and anteroseptal ischemia, normal biventricular size and function. ECHO 1/2021 revealed normal EF 65 % with moderate LVH, mild AS and mild MR.  In light of patients risk factors, CCS class anginal equivalent sx, abnormal NST; pt is now referred for R + L cardiac cath with possible intervention.    Cardiologist: Dr. Esposito  COVID: COVID PCR 6/18/21 @ [facility], negative  Escort: sister  Pharmacy: [facility], [place], [place]     71 y/o F former smoker with PMH of HTN, COPD with chronic bronchitis, VINCE (not on CPAP at home), pulmonary fibrosis likely 2/2 911 exposure (not on home O2 pre-covid),  HFpEF (EF 75%), COVID-19 PNA 03/2021 (discharged on home O2 + decadron); course c/b sinus bradycardia (felt by EP to be 2/2 covid; toprol d/c at that time) with readmission on 04/2021 for ?bacterial PNA vs. worsening of pulmonary fibrosis from COVID. Pt was initially planned for  ambi cardiac cath 03/2021 (abnormal NST 02/2021) which was deferred 2/2 COVID. Recently she returned to cardiologist Dr. Esposito and continues to endorse FAROOQ which has been ongoing for the past 8 months. Pt has SOB on ambulating less than 1 block which is worse with exertion and relieved with rest.  Pt denies any chest pain at rest or exertion, denies dizziness, palpitation, N/V, LE edema, PND/orthopnea, syncope, abdominal pain, fever/chills.  NST 2/15/21 revealed mild anteroapical and anteroseptal ischemia, normal biventricular size and function. ECHO 1/2021 revealed normal EF 65 % with moderate LVH, mild AS and mild MR.  In light of patients risk factors, CCS class III anginal equivalent sx, abnormal NST; pt is now referred for R + L cardiac cath with possible intervention.    Cardiologist: Dr. Esposito  COVID: COVID PCR 6/18/21 @ [facility], negative  Escort: sister  Pharmacy: [address], [place], [place]     69 y/o F former light smoker (1ppw x 2 yrs) with PMH HTN, COPD with chronic bronchitis, pulmonary fibrosis 2/2 9-11 exposure, VINCE (not on CPAP at home), COVID-19 PNA 03/2021 (discharged on home O2 + decadron), with readmission on 04/2021 for bacterial PNA vs. worsening of pulmonary fibrosis from COVID (currently on 3L home O2), who was initially planned for ambulatory cardiac cath 03/2021, which was postponed 2/2 COVID infection. Recently she returned to cardiologist Dr. Esposito and continues to endorse FAROOQ which has been ongoing for the past 8 months. Pt has SOB on ambulating less than 1 block which is worse with exertion and relieved with rest.  Pt denies any chest pain at rest or exertion, denies dizziness, palpitation, N/V, LE edema, PND/orthopnea, syncope, abdominal pain, fever/chills.  NST 2/15/21 revealed mild anteroapical and anteroseptal ischemia, normal biventricular size and function. ECHO 1/2021 revealed normal EF 65% with moderate LVH, mild AS and mild MR. In light of patients risk factors, CCS class III anginal equivalent sx, and abnormal NST, pt is referred for R + L cardiac cath with possible intervention.

## 2021-06-16 NOTE — H&P ADULT - ASSESSMENT
69 y/o F former light smoker (1ppw x 2 yrs) with PMH HTN, COPD with chronic bronchitis, pulmonary fibrosis 2/2 9-11 exposure, VINCE (not on CPAP at home), COVID-19 PNA 03/2021 (discharged on home O2 + decadron), with readmission on 04/2021 for bacterial PNA vs. worsening of pulmonary fibrosis from COVID (currently on 3L home O2), who was initially planned for ambulatory cardiac cath 03/2021, which was postponed 2/2 COVID infection, and is now referred for R/LHC in light of patients risk factors, CCS class III anginal equivalent sx, and abnormal NST.    -ASA III, Mallampati IV  -suitable candidate for moderate sedation  -DAPT: pt reports compliance with aspirin 81mg daily, last dose taken thiS AM 6/21. Load plavix 600mg  -NS 75cc/hr  -cath consent obtained    Risks & benefits of procedure and alternative therapy have been explained to the patient including but not limited to: allergic reaction, bleeding w/possible need for blood transfusion, infection, renal and vascular compromise, limb damage, arrhythmia, stroke, vessel dissection/perforation, Myocardial infarction, emergent CABG.  69 y/o F former light smoker (1ppw x 2 yrs) with PMH HTN, COPD with chronic bronchitis, pulmonary fibrosis 2/2 9-11 exposure, VINCE (not on CPAP at home), COVID-19 PNA 03/2021 (discharged on home O2 + decadron), with readmission on 04/2021 for bacterial PNA vs. worsening of pulmonary fibrosis from COVID (currently on 3L home O2), who was initially planned for ambulatory cardiac cath 03/2021, which was postponed 2/2 COVID infection, and is now referred for R/LHC in light of patients risk factors, CCS class III anginal equivalent sx, and abnormal NST.    -ASA III, Mallampati IV  -suitable candidate for moderate sedation  -DAPT: pt reports compliance with aspirin 81mg daily, last dose taken thiS AM 6/21. Load plavix 600mg  -NS 75cc/hr  -Na noted to be 150, no neurological deficits, denies poor PO intake. Encouraged to f/u with PMD for repeat labs if d/c home  -cath consent obtained    Risks & benefits of procedure and alternative therapy have been explained to the patient including but not limited to: allergic reaction, bleeding w/possible need for blood transfusion, infection, renal and vascular compromise, limb damage, arrhythmia, stroke, vessel dissection/perforation, Myocardial infarction, emergent CABG.  71 y/o F former light smoker (1ppw x 2 yrs) with PMH HTN, COPD with chronic bronchitis, pulmonary fibrosis 2/2 9-11 exposure, VINCE (not on CPAP at home), COVID-19 PNA 03/2021 (discharged on home O2 + decadron), with readmission on 04/2021 for bacterial PNA vs. worsening of pulmonary fibrosis from COVID (currently on 3L home O2), who was initially planned for ambulatory cardiac cath 03/2021, which was postponed 2/2 COVID infection, and is now referred for R/LHC in light of patients risk factors, CCS class III anginal equivalent sx, and abnormal NST.    -ASA III, Mallampati IV  -suitable candidate for moderate sedation  -DAPT: pt reports compliance with aspirin 81mg daily, last dose taken thiS AM 6/21. Load plavix 600mg  -NS 75cc/hr  -Na noted to be 150, no neurological deficits, denies poor PO intake or overt fluid losses. Encouraged to f/u with PMD for repeat labs if d/c home  -cath consent obtained    Risks & benefits of procedure and alternative therapy have been explained to the patient including but not limited to: allergic reaction, bleeding w/possible need for blood transfusion, infection, renal and vascular compromise, limb damage, arrhythmia, stroke, vessel dissection/perforation, Myocardial infarction, emergent CABG.

## 2021-06-16 NOTE — H&P ADULT - HEIGHT IN INCHES
Fax received via [facility] for refill of Levothyroxine. Patient last saw Dr. Ena Shukla on 2/1/19 and has since established with Dr. Maria Elena Ann. Message forwarded to Dr. Corinne Donalds pool. [phone]

## 2021-06-21 ENCOUNTER — OUTPATIENT (OUTPATIENT)
Dept: OUTPATIENT SERVICES | Facility: HOSPITAL | Age: 71
LOS: 1 days | Discharge: HOME | End: 2021-06-21
Payer: MEDICARE

## 2021-06-21 DIAGNOSIS — I25.110 ATHEROSCLEROTIC HEART DISEASE OF NATIVE CORONARY ARTERY WITH UNSTABLE ANGINA PECTORIS: ICD-10-CM

## 2021-06-21 DIAGNOSIS — Z98.890 OTHER SPECIFIED POSTPROCEDURAL STATES: Chronic | ICD-10-CM

## 2021-06-21 DIAGNOSIS — R94.39 ABNORMAL RESULT OF OTHER CARDIOVASCULAR FUNCTION STUDY: ICD-10-CM

## 2021-06-21 DIAGNOSIS — E78.5 HYPERLIPIDEMIA, UNSPECIFIED: ICD-10-CM

## 2021-06-21 DIAGNOSIS — I10 ESSENTIAL (PRIMARY) HYPERTENSION: ICD-10-CM

## 2021-06-21 DIAGNOSIS — I27.0 PRIMARY PULMONARY HYPERTENSION: ICD-10-CM

## 2021-06-21 DIAGNOSIS — Z87.42 PERSONAL HISTORY OF OTHER DISEASES OF THE FEMALE GENITAL TRACT: Chronic | ICD-10-CM

## 2021-06-21 LAB
A1C WITH ESTIMATED AVERAGE GLUCOSE RESULT: 5.3 % — SIGNIFICANT CHANGE UP (ref 4–5.6)
ALBUMIN SERPL ELPH-MCNC: 4.2 G/DL — SIGNIFICANT CHANGE UP (ref 3.3–5)
ALP SERPL-CCNC: 67 U/L — SIGNIFICANT CHANGE UP (ref 40–120)
ALT FLD-CCNC: 20 U/L — SIGNIFICANT CHANGE UP (ref 10–45)
ANION GAP SERPL CALC-SCNC: 12 MMOL/L — SIGNIFICANT CHANGE UP (ref 5–17)
ANISOCYTOSIS BLD QL: SLIGHT — SIGNIFICANT CHANGE UP
APTT BLD: 29.7 SEC — SIGNIFICANT CHANGE UP (ref 27.5–35.5)
AST SERPL-CCNC: 24 U/L — SIGNIFICANT CHANGE UP (ref 10–40)
BASOPHILS # BLD AUTO: 0 K/UL — SIGNIFICANT CHANGE UP (ref 0–0.2)
BASOPHILS NFR BLD AUTO: 0 % — SIGNIFICANT CHANGE UP (ref 0–2)
BILIRUB DIRECT SERPL-MCNC: 0.2 MG/DL — SIGNIFICANT CHANGE UP (ref 0–0.2)
BILIRUB INDIRECT FLD-MCNC: 0.2 MG/DL — SIGNIFICANT CHANGE UP (ref 0.2–1)
BILIRUB SERPL-MCNC: 0.4 MG/DL — SIGNIFICANT CHANGE UP (ref 0.2–1.2)
BUN SERPL-MCNC: 20 MG/DL — SIGNIFICANT CHANGE UP (ref 7–23)
CALCIUM SERPL-MCNC: 9.4 MG/DL — SIGNIFICANT CHANGE UP (ref 8.4–10.5)
CHLORIDE SERPL-SCNC: 113 MMOL/L — HIGH (ref 96–108)
CHOLEST SERPL-MCNC: 186 MG/DL — SIGNIFICANT CHANGE UP
CO2 SERPL-SCNC: 25 MMOL/L — SIGNIFICANT CHANGE UP (ref 22–31)
CREAT SERPL-MCNC: 0.75 MG/DL — SIGNIFICANT CHANGE UP (ref 0.5–1.3)
EOSINOPHIL # BLD AUTO: 0 K/UL — SIGNIFICANT CHANGE UP (ref 0–0.5)
EOSINOPHIL NFR BLD AUTO: 0 % — SIGNIFICANT CHANGE UP (ref 0–6)
ESTIMATED AVERAGE GLUCOSE: 105 MG/DL — SIGNIFICANT CHANGE UP (ref 68–114)
GIANT PLATELETS BLD QL SMEAR: PRESENT — SIGNIFICANT CHANGE UP
GLUCOSE SERPL-MCNC: 90 MG/DL — SIGNIFICANT CHANGE UP (ref 70–99)
HCT VFR BLD CALC: 37.4 % — SIGNIFICANT CHANGE UP (ref 34.5–45)
HDLC SERPL-MCNC: 100 MG/DL — SIGNIFICANT CHANGE UP
HGB BLD-MCNC: 12 G/DL — SIGNIFICANT CHANGE UP (ref 11.5–15.5)
INR BLD: 1.04 — SIGNIFICANT CHANGE UP (ref 0.88–1.16)
LIPID PNL WITH DIRECT LDL SERPL: 70 MG/DL — SIGNIFICANT CHANGE UP
LYMPHOCYTES # BLD AUTO: 4.55 K/UL — HIGH (ref 1–3.3)
LYMPHOCYTES # BLD AUTO: 47.4 % — HIGH (ref 13–44)
MACROCYTES BLD QL: SLIGHT — SIGNIFICANT CHANGE UP
MANUAL SMEAR VERIFICATION: SIGNIFICANT CHANGE UP
MCHC RBC-ENTMCNC: 32.1 GM/DL — SIGNIFICANT CHANGE UP (ref 32–36)
MCHC RBC-ENTMCNC: 32.1 PG — SIGNIFICANT CHANGE UP (ref 27–34)
MCV RBC AUTO: 100 FL — SIGNIFICANT CHANGE UP (ref 80–100)
MONOCYTES # BLD AUTO: 0.42 K/UL — SIGNIFICANT CHANGE UP (ref 0–0.9)
MONOCYTES NFR BLD AUTO: 4.4 % — SIGNIFICANT CHANGE UP (ref 2–14)
NEUTROPHILS # BLD AUTO: 4.62 K/UL — SIGNIFICANT CHANGE UP (ref 1.8–7.4)
NEUTROPHILS NFR BLD AUTO: 48.2 % — SIGNIFICANT CHANGE UP (ref 43–77)
NON HDL CHOLESTEROL: 86 MG/DL — SIGNIFICANT CHANGE UP
OVALOCYTES BLD QL SMEAR: SLIGHT — SIGNIFICANT CHANGE UP
PLAT MORPH BLD: ABNORMAL
PLATELET # BLD AUTO: 186 K/UL — SIGNIFICANT CHANGE UP (ref 150–400)
POTASSIUM SERPL-MCNC: 3.9 MMOL/L — SIGNIFICANT CHANGE UP (ref 3.5–5.3)
POTASSIUM SERPL-SCNC: 3.9 MMOL/L — SIGNIFICANT CHANGE UP (ref 3.5–5.3)
PROT SERPL-MCNC: 6.9 G/DL — SIGNIFICANT CHANGE UP (ref 6–8.3)
PROTHROM AB SERPL-ACNC: 12.5 SEC — SIGNIFICANT CHANGE UP (ref 10.6–13.6)
RBC # BLD: 3.74 M/UL — LOW (ref 3.8–5.2)
RBC # FLD: 12.4 % — SIGNIFICANT CHANGE UP (ref 10.3–14.5)
RBC BLD AUTO: ABNORMAL
SMUDGE CELLS # BLD: PRESENT — SIGNIFICANT CHANGE UP
SODIUM SERPL-SCNC: 150 MMOL/L — HIGH (ref 135–145)
TRIGL SERPL-MCNC: 80 MG/DL — SIGNIFICANT CHANGE UP
WBC # BLD: 9.59 K/UL — SIGNIFICANT CHANGE UP (ref 3.8–10.5)
WBC # FLD AUTO: 9.59 K/UL — SIGNIFICANT CHANGE UP (ref 3.8–10.5)

## 2021-06-21 PROCEDURE — 93010 ELECTROCARDIOGRAM REPORT: CPT

## 2021-06-21 PROCEDURE — 85610 PROTHROMBIN TIME: CPT

## 2021-06-21 PROCEDURE — 82248 BILIRUBIN DIRECT: CPT

## 2021-06-21 PROCEDURE — C1769: CPT

## 2021-06-21 PROCEDURE — 99153 MOD SED SAME PHYS/QHP EA: CPT

## 2021-06-21 PROCEDURE — 80053 COMPREHEN METABOLIC PANEL: CPT

## 2021-06-21 PROCEDURE — 99152 MOD SED SAME PHYS/QHP 5/>YRS: CPT

## 2021-06-21 PROCEDURE — 93005 ELECTROCARDIOGRAM TRACING: CPT

## 2021-06-21 PROCEDURE — 93503 INSERT/PLACE HEART CATHETER: CPT | Mod: 59

## 2021-06-21 PROCEDURE — 93460 R&L HRT ART/VENTRICLE ANGIO: CPT

## 2021-06-21 PROCEDURE — 80061 LIPID PANEL: CPT

## 2021-06-21 PROCEDURE — C1887: CPT

## 2021-06-21 PROCEDURE — 85025 COMPLETE CBC W/AUTO DIFF WBC: CPT

## 2021-06-21 PROCEDURE — 83036 HEMOGLOBIN GLYCOSYLATED A1C: CPT

## 2021-06-21 PROCEDURE — 85730 THROMBOPLASTIN TIME PARTIAL: CPT

## 2021-06-21 PROCEDURE — C1894: CPT

## 2021-06-21 RX ORDER — FLUTICASONE PROPIONATE 50 MCG
1 SPRAY, SUSPENSION NASAL
Qty: 0 | Refills: 0 | DISCHARGE

## 2021-06-21 RX ORDER — METOPROLOL TARTRATE 50 MG
0.5 TABLET ORAL
Qty: 15 | Refills: 3
Start: 2021-06-21 | End: 2021-10-18

## 2021-06-21 RX ORDER — SODIUM CHLORIDE 9 MG/ML
500 INJECTION INTRAMUSCULAR; INTRAVENOUS; SUBCUTANEOUS
Refills: 0 | Status: DISCONTINUED | OUTPATIENT
Start: 2021-06-21 | End: 2021-07-05

## 2021-06-21 RX ORDER — CLOPIDOGREL BISULFATE 75 MG/1
600 TABLET, FILM COATED ORAL ONCE
Refills: 0 | Status: COMPLETED | OUTPATIENT
Start: 2021-06-21 | End: 2021-06-21

## 2021-06-21 RX ORDER — ISOSORBIDE MONONITRATE 60 MG/1
1 TABLET, EXTENDED RELEASE ORAL
Qty: 0 | Refills: 0 | DISCHARGE

## 2021-06-21 RX ORDER — ISOSORBIDE DINITRATE 5 MG/1
1 TABLET ORAL
Qty: 0 | Refills: 0 | DISCHARGE

## 2021-06-21 RX ORDER — METOPROLOL TARTRATE 50 MG
1 TABLET ORAL
Qty: 0 | Refills: 0 | DISCHARGE

## 2021-06-21 RX ORDER — SODIUM CHLORIDE 9 MG/ML
500 INJECTION INTRAMUSCULAR; INTRAVENOUS; SUBCUTANEOUS
Refills: 0 | Status: DISCONTINUED | OUTPATIENT
Start: 2021-06-21 | End: 2021-06-21

## 2021-06-21 RX ORDER — IPRATROPIUM BROMIDE 21 MCG
2 AEROSOL, SPRAY (ML) NASAL
Qty: 0 | Refills: 0 | DISCHARGE

## 2021-06-21 RX ORDER — BUDESONIDE, GLYCOPYRROLATE, AND FORMOTEROL FUMARATE 160; 9; 4.8 UG/1; UG/1; UG/1
2 AEROSOL, METERED RESPIRATORY (INHALATION)
Qty: 0 | Refills: 0 | DISCHARGE

## 2021-06-21 RX ORDER — ASPIRIN/CALCIUM CARB/MAGNESIUM 324 MG
1 TABLET ORAL
Qty: 0 | Refills: 0 | DISCHARGE

## 2021-06-21 RX ADMIN — SODIUM CHLORIDE 50 MILLILITER(S): 9 INJECTION INTRAMUSCULAR; INTRAVENOUS; SUBCUTANEOUS at 13:49

## 2021-06-21 RX ADMIN — CLOPIDOGREL BISULFATE 600 MILLIGRAM(S): 75 TABLET, FILM COATED ORAL at 14:01

## 2021-06-21 RX ADMIN — SODIUM CHLORIDE 75 MILLILITER(S): 9 INJECTION INTRAMUSCULAR; INTRAVENOUS; SUBCUTANEOUS at 14:03

## 2021-06-21 RX ADMIN — SODIUM CHLORIDE 75 MILLILITER(S): 9 INJECTION INTRAMUSCULAR; INTRAVENOUS; SUBCUTANEOUS at 19:04

## 2021-06-21 NOTE — PROGRESS NOTE ADULT - SUBJECTIVE AND OBJECTIVE BOX
Interventional Cardiology PA SDA Discharge Note    Patient without complaints. Ambulated and voided without difficulties    Afebrile, VSS    Ext: Right Radial:  no hematoma,  no  bleeding, dressing; C/D/I  Pulses:    intact RAD/DP/PT to baseline     A/P:  71 y/o F former light smoker (1ppw x 2 yrs) with PMH HTN, COPD with chronic bronchitis, pulmonary fibrosis 2/2 9-11 exposure, VINCE (not on CPAP at home), COVID-19 PNA 03/2021 (discharged on home O2 + decadron), with readmission on 04/2021 for bacterial PNA vs. worsening of pulmonary fibrosis from COVID (currently on 3L home O2), who was initially planned for ambulatory cardiac cath 03/2021, which was postponed 2/2 COVID infection. Recently she returned to cardiologist Dr. Esposito and continues to endorse FAROOQ which has been ongoing for the past 8 months. Pt has SOB on ambulating less than 1 block which is worse with exertion and relieved with rest.  Pt denies any chest pain at rest or exertion, denies dizziness, palpitation, N/V, LE edema, PND/orthopnea, syncope, abdominal pain, fever/chills.  NST 2/15/21 revealed mild anteroapical and anteroseptal ischemia, normal biventricular size and function. ECHO 1/2021 revealed normal EF 65% with moderate LVH, mild AS and mild MR. In light of patients risk factors, CCS class III anginal equivalent sx, and abnormal NST, pt is referred for R + L cardiac cath with possible intervention.     Pt is s/p diagnostic Right and Left Heart cardiac cath 6/21/21:  RHC:  RA 11, RA Sat 67%, RV 39/0/7, PA 46/12/22, PA Sat 69%, PCWP 13.  CO 5.3, CI 3.0. LHC:  LM normal, LAD with myocardial bridge, otherwise normal, LCx normal, RCA dominant, normal,  LV gram deferred , EDP 13mmHg.      1.	Stable for discharge as per attending Dr. Esposito after bed rest, pt voids, groin/wrist stable and 30 minutes of ambulation.  2.	Follow-up with Cardiologist, Dr. Esposito in 2 weeks  3.	Discharged forms signed and copies in chart     Interventional Cardiology PA SDA Discharge Note    Patient without complaints. Ambulated and voided without difficulties    Afebrile, VSS    Ext: Right Radial:  no hematoma,  no  bleeding, dressing; C/D/I  Pulses:    intact RAD/DP/PT to baseline     A/P:  69 y/o F former light smoker (1ppw x 2 yrs) with PMH HTN, COPD with chronic bronchitis, pulmonary fibrosis 2/2 9-11 exposure, VINCE (not on CPAP at home), COVID-19 PNA 03/2021 (discharged on home O2 + decadron), with readmission on 04/2021 for bacterial PNA vs. worsening of pulmonary fibrosis from COVID (currently on 3L home O2), who was initially planned for ambulatory cardiac cath 03/2021, which was postponed 2/2 COVID infection. Recently she returned to cardiologist Dr. Esposito and continues to endorse FAROOQ which has been ongoing for the past 8 months. Pt has SOB on ambulating less than 1 block which is worse with exertion and relieved with rest.  Pt denies any chest pain at rest or exertion, denies dizziness, palpitation, N/V, LE edema, PND/orthopnea, syncope, abdominal pain, fever/chills.  NST 2/15/21 revealed mild anteroapical and anteroseptal ischemia, normal biventricular size and function. ECHO 1/2021 revealed normal EF 65% with moderate LVH, mild AS and mild MR. In light of patients risk factors, CCS class III anginal equivalent sx, and abnormal NST, pt is referred for R + L cardiac cath with possible intervention.     Pt is s/p diagnostic Right and Left Heart cardiac cath 6/21/21:  RHC:  RA 11, RA Sat 67%, RV 39/0/7, PA 46/12/22, PA Sat 69%, PCWP 13.  CO 5.3, CI 3.0. LHC:  LM normal, LAD with myocardial bridge, otherwise normal, LCx normal, RCA dominant, normal,  LV gram deferred , EDP 13mmHg.  Given LAD myocardial bridge, Imdur discontinued.  ASA discontinues 2/2 to normal coronaries and Metoprolol decreased to 12.5mg daily    1.	Stable for discharge as per attending Dr. Esposito after bed rest, pt voids, groin/wrist stable and 30 minutes of ambulation.  2.	Follow-up with Cardiologist, Dr. Esposito in 2 weeks  3.	Discharged forms signed and copies in chart

## 2021-10-05 PROBLEM — F41.9 ANXIETY DISORDER, UNSPECIFIED: Chronic | Status: ACTIVE | Noted: 2021-06-21

## 2021-10-08 ENCOUNTER — APPOINTMENT (OUTPATIENT)
Dept: CT IMAGING | Facility: CLINIC | Age: 71
End: 2021-10-08
Payer: MEDICARE

## 2021-10-08 ENCOUNTER — OUTPATIENT (OUTPATIENT)
Dept: OUTPATIENT SERVICES | Facility: HOSPITAL | Age: 71
LOS: 1 days | End: 2021-10-08

## 2021-10-08 DIAGNOSIS — Z98.890 OTHER SPECIFIED POSTPROCEDURAL STATES: Chronic | ICD-10-CM

## 2021-10-08 DIAGNOSIS — Z87.42 PERSONAL HISTORY OF OTHER DISEASES OF THE FEMALE GENITAL TRACT: Chronic | ICD-10-CM

## 2021-10-08 PROCEDURE — 71250 CT THORAX DX C-: CPT | Mod: 26,MH

## 2021-10-14 ENCOUNTER — TRANSCRIPTION ENCOUNTER (OUTPATIENT)
Age: 71
End: 2021-10-14

## 2021-10-14 ENCOUNTER — OUTPATIENT (OUTPATIENT)
Dept: OUTPATIENT SERVICES | Facility: HOSPITAL | Age: 71
LOS: 1 days | End: 2021-10-14
Payer: MEDICARE

## 2021-10-14 DIAGNOSIS — Z98.890 OTHER SPECIFIED POSTPROCEDURAL STATES: Chronic | ICD-10-CM

## 2021-10-14 DIAGNOSIS — Z87.42 PERSONAL HISTORY OF OTHER DISEASES OF THE FEMALE GENITAL TRACT: Chronic | ICD-10-CM

## 2021-10-14 LAB — GLUCOSE BLDC GLUCOMTR-MCNC: 103 MG/DL — HIGH (ref 70–99)

## 2021-10-14 PROCEDURE — A9552: CPT

## 2021-10-14 PROCEDURE — 78815 PET IMAGE W/CT SKULL-THIGH: CPT | Mod: 26,MH

## 2021-10-14 PROCEDURE — 82962 GLUCOSE BLOOD TEST: CPT

## 2021-10-14 PROCEDURE — 78815 PET IMAGE W/CT SKULL-THIGH: CPT | Mod: MH

## 2022-01-03 ENCOUNTER — PREPPED CHART (OUTPATIENT)
Dept: URBAN - METROPOLITAN AREA CLINIC 94 | Facility: CLINIC | Age: 72
End: 2022-01-03

## 2022-01-03 PROBLEM — H25.13 NUCLEAR SCLEROSIS: Noted: 2022-01-03

## 2022-02-14 NOTE — DIETITIAN INITIAL EVALUATION ADULT. - PROBLEM SELECTOR PLAN 1
pt meeting 4/4 SIRS criteria, also with lactate, mild LFT elevation - although lactate also possibly elevated from significant tachypnea on arrival  30 cc/kg = 2200cc, pt received 1L and tachycardia improved - will hold off on further fluids given hx diastolic HF  f/u bcx  suspect superimposed bacterial pna, care as below Statement Selected

## 2022-03-11 ENCOUNTER — OUTPATIENT (OUTPATIENT)
Dept: OUTPATIENT SERVICES | Facility: HOSPITAL | Age: 72
LOS: 1 days | End: 2022-03-11

## 2022-03-11 ENCOUNTER — APPOINTMENT (OUTPATIENT)
Dept: CT IMAGING | Facility: CLINIC | Age: 72
End: 2022-03-11
Payer: MEDICARE

## 2022-03-11 DIAGNOSIS — Z98.890 OTHER SPECIFIED POSTPROCEDURAL STATES: Chronic | ICD-10-CM

## 2022-03-11 DIAGNOSIS — Z87.42 PERSONAL HISTORY OF OTHER DISEASES OF THE FEMALE GENITAL TRACT: Chronic | ICD-10-CM

## 2022-03-11 PROCEDURE — 71250 CT THORAX DX C-: CPT | Mod: 26,MH

## 2022-04-20 NOTE — PROVIDER CONTACT NOTE (CRITICAL VALUE NOTIFICATION) - SITUATION
Assessment and Plan:     Problem List Items Addressed This Visit        Respiratory    Chronic obstructive pulmonary disease with acute exacerbation ([facility] [address] ) - Primary     · Not in exacerbation   · Previous outpatient exacerbation 1/2022 treated with Prednisone and Azithromycin   · O2- 95% on room air  · Physical exam with wheezing bilateral upper lobes   · Continue home Bevespi 2 puff BID with Albuterol prn            Relevant Medications    albuterol (PROVENTIL HFA,VENTOLIN HFA) 90 mcg/act inhaler    glycopyrrolate-formoterol (BEVESPI AEROSPHERE) 9-4 8 MCG/ACT inhaler       Cardiovascular and Mediastinum    Essential hypertension     · Current /60, Goal BP <140/90 per JNC 8 guidelines, controlled  · Current Home Medications: Lisinopril 40 mg, Amlodipine 5 mg   · Per nephro: hold ACEI if dehydration due to GI losses due to risk of LILLY secondary to failure to autoregulate  No evidence of dehydration  · Statin currently used: none, will discuss starting on Statin at next visit  , ASCVD Risk- 26 9  · Continue Lisinopril 40 mg, Amlodipine 5 mg            Relevant Medications    lisinopril (ZESTRIL) 40 mg tablet    amLODIPine (NORVASC) 5 mg tablet    Other Relevant Orders    Lipid Panel with Direct LDL reflex      Other Visit Diagnoses     CKD (chronic kidney disease) stage 2, GFR 60-89 ml/min        Relevant Medications    amLODIPine (NORVASC) 5 mg tablet    Exercise-induced asthma        Relevant Medications    albuterol (PROVENTIL HFA,VENTOLIN HFA) 90 mcg/act inhaler    glycopyrrolate-formoterol (BEVESPI AEROSPHERE) 9-4 8 MCG/ACT inhaler    Chronic obstructive pulmonary disease, unspecified COPD type (HCC)        Relevant Medications    albuterol (PROVENTIL HFA,VENTOLIN HFA) 90 mcg/act inhaler    glycopyrrolate-formoterol (BEVESPI AEROSPHERE) 9-4 8 MCG/ACT inhaler    BMI 27 0-27 9,adult            BMI Counseling: Body mass index is 27 83 kg/m²   The BMI is above normal  Nutrition recommendations include decreasing portion sizes  Rationale for BMI follow-up plan is due to patient being overweight or obese  Preventive health issues were discussed with patient, and age appropriate screening tests were ordered as noted in patient's After Visit Summary  Personalized health advice and appropriate referrals for health education or preventive services given if needed, as noted in patient's After Visit Summary       History of Present Illness:     Patient presents for Medicare Annual Wellness visit    Patient Care Team:  Bunny Lino MD as PCP - General (Family Medicine)     Problem List:     Patient Active Problem List   Diagnosis    Presbycusis    Osteopenia    Essential hypertension    Chronic obstructive pulmonary disease with acute exacerbation (HCC)    Tachycardia    Hyperlipidemia    CKD stage G3a/A2, GFR 45-59 and albumin creatinine ratio  mg/g    Cyst of kidney, acquired    Vitamin D deficiency    Persistent proteinuria    Healthcare maintenance      Past Medical and Surgical History:     Past Medical History:   Diagnosis Date    CKD (chronic kidney disease)     Hypertension     Microalbuminuria      Past Surgical History:   Procedure Laterality Date    APPENDECTOMY      BEDSIDE SPIROMETRY WITH BRONCHODILATOR PRE/POST  4/21/2021    HERNIA REPAIR      STRABISMUS SURGERY        Family History:     Family History   Problem Relation Age of Onset    No Known Problems Mother     No Known Problems Father       Social History:     Social History     Socioeconomic History    Marital status: Single     Spouse name: None    Number of children: None    Years of education: None    Highest education level: None   Occupational History    None   Tobacco Use    Smoking status: Former Smoker     Types: Cigarettes     Start date: 12/18/2018    Smokeless tobacco: Never Used   Vaping Use    Vaping Use: Never used   Substance and Sexual Activity    Alcohol use: Yes     Comment: Rarely; once a year Received a call from the lab with above mentioned critical result.  Drug use: Never    Sexual activity: None   Other Topics Concern    None   Social History Narrative    None     Social Determinants of Health     Financial Resource Strain: Low Risk     Difficulty of Paying Living Expenses: Not hard at all   Food Insecurity: No Food Insecurity    Worried About Running Out of Food in the Last Year: Never true    Pepe of Food in the Last Year: Never true   Transportation Needs: No Transportation Needs    Lack of Transportation (Medical): No    Lack of Transportation (Non-Medical): No   Physical Activity: Not on file   Stress: Not on file   Social Connections: Not on file   Intimate Partner Violence: Not on file   Housing Stability: Not on file      Medications and Allergies:     Current Outpatient Medications   Medication Sig Dispense Refill    albuterol (PROVENTIL HFA,VENTOLIN HFA) 90 mcg/act inhaler Inhale 2 puffs every 6 (six) hours as needed for wheezing 18 g 0    amLODIPine (NORVASC) 5 mg tablet Take 1 tablet (5 mg total) by mouth daily 90 tablet 3    bisacodyl (DULCOLAX) 5 mg EC tablet Take 1 tablet (5 mg total) by mouth see administration instructions Take 2 tablets at 12:00 p m  on day prior to colonoscopy  Take 2 tablets at 4:00 p m  on day prior to colonoscopy 4 tablet 0    Blood Pressure KIT Use daily 1 kit 0    Cholecalciferol (Vitamin D) 50 MCG (2000 UT) CAPS Take by mouth daily      ergocalciferol (ERGOCALCIFEROL) 1 25 MG (97141 UT) capsule Take 1 capsule (50,000 Units total) by mouth once a week (Patient not taking: Reported on 9/13/2021) 12 capsule 0    glycopyrrolate-formoterol (BEVESPI AEROSPHERE) 9-4 8 MCG/ACT inhaler Inhale 2 puffs 2 (two) times a day 10 7 g 3    lisinopril (ZESTRIL) 40 mg tablet Take 1 tablet (40 mg total) by mouth every evening 90 tablet 3    polyethylene glycol (GLYCOLAX) 17 GM/SCOOP powder Take 238 g by mouth see administration instructions Mix 238 g with 64 oz of clear liquid    Drink half starting at noon on the day prior to colonoscopy  Drink remaining half 6 hours prior to procedure on day of colonoscopy  238 g 0     No current facility-administered medications for this visit  No Known Allergies   Immunizations:     Immunization History   Administered Date(s) Administered    COVID-19 MODERNA VACC 0 5 ML IM 04/20/2021, 05/19/2021    COVID-19 PFIZER VACCINE 0 3 ML IM 04/05/2022    Influenza, high dose seasonal 0 7 mL 09/11/2018, 12/18/2019, 10/13/2021    Pneumococcal Conjugate 13-Valent 02/15/2018, 09/11/2018    Pneumococcal Polysaccharide PPV23 12/18/2019    Tdap 02/23/2010, 12/21/2016      Health Maintenance:         Topic Date Due    Colorectal Cancer Screening  Never done    Hepatitis C Screening  Completed     There are no preventive care reminders to display for this patient  Medicare Health Risk Assessment:     /64 (BP Location: Left arm, Patient Position: Sitting, Cuff Size: Standard)   Pulse 95   Temp (!) 97 3 °F (36 3 °C)   Resp 20   Wt 83 kg (183 lb)   SpO2 95%   BMI 27 83 kg/m²          Health Risk Assessment:   Patient rates overall health as fair  Patient feels that their physical health rating is same  Patient is satisfied with their life  Eyesight was rated as same  Hearing was rated as slightly worse  Patient feels that their emotional and mental health rating is same  Patients states they are never, rarely angry  Patient states they are never, rarely unusually tired/fatigued  Pain experienced in the last 7 days has been none  Patient states that he has experienced no weight loss or gain in last 6 months  Fall Risk Screening: In the past year, patient has experienced: no history of falling in past year      Home Safety:  Patient does not have trouble with stairs inside or outside of their home  Patient has working smoke alarms and has working carbon monoxide detector  Home safety hazards include: none  Nutrition:   Current diet is Regular       Medications:   Patient is not currently taking any over-the-counter supplements  Patient is able to manage medications  Activities of Daily Living (ADLs)/Instrumental Activities of Daily Living (IADLs):   Walk and transfer into and out of bed and chair?: Yes  Dress and groom yourself?: Yes    Bathe or shower yourself?: Yes    Feed yourself? Yes  Do your laundry/housekeeping?: Yes  Manage your money, pay your bills and track your expenses?: Yes  Make your own meals?: Yes    Do your own shopping?: Yes    Previous Hospitalizations:   Any hospitalizations or ED visits within the last 12 months?: No      PREVENTIVE SCREENINGS      Cardiovascular Screening:    General: Screening Not Indicated and History Lipid Disorder      Diabetes Screening:     General: Screening Current      Colorectal Cancer Screening:     General: Risks and Benefits Discussed      Prostate Cancer Screening:    General: Screening Not Indicated      Abdominal Aortic Aneurysm (AAA) Screening:    Risk factors include: age between 73-67 yo and tobacco use        Lung Cancer Screening:     General: Screening Not Indicated      Hepatitis C Screening:    General: Screening Current      Preventive Screening Comments: FIVE WISHES documentation provided    Screening, Brief Intervention, and Referral to Treatment (SBIRT)    Screening  Typical number of drinks in a day: 0  Typical number of drinks in a week: 0  Interpretation: Low risk drinking behavior  Single Item Drug Screening:  How often have you used an illegal drug (including marijuana) or a prescription medication for non-medical reasons in the past year? never    Single Item Drug Screen Score: 0  Interpretation: Negative screen for possible drug use disorder    Souleymane Marinelli MD  BMI Counseling: Body mass index is 27 83 kg/m²  The BMI is above normal  Nutrition recommendations include reducing portion sizes, decreasing overall calorie intake and 3-5 servings of fruits/vegetables daily   Exercise recommendations include exercising 3-5 times per week

## 2022-09-12 ENCOUNTER — APPOINTMENT (OUTPATIENT)
Dept: CT IMAGING | Facility: CLINIC | Age: 72
End: 2022-09-12

## 2022-09-12 ENCOUNTER — OUTPATIENT (OUTPATIENT)
Dept: OUTPATIENT SERVICES | Facility: HOSPITAL | Age: 72
LOS: 1 days | End: 2022-09-12

## 2022-09-12 DIAGNOSIS — Z98.890 OTHER SPECIFIED POSTPROCEDURAL STATES: Chronic | ICD-10-CM

## 2022-09-12 DIAGNOSIS — Z87.42 PERSONAL HISTORY OF OTHER DISEASES OF THE FEMALE GENITAL TRACT: Chronic | ICD-10-CM

## 2022-09-12 PROCEDURE — 71250 CT THORAX DX C-: CPT | Mod: 26,MH

## 2023-02-07 ENCOUNTER — ESTABLISHED COMPREHENSIVE EXAM (OUTPATIENT)
Dept: URBAN - METROPOLITAN AREA CLINIC 94 | Facility: CLINIC | Age: 73
End: 2023-02-07

## 2023-02-07 DIAGNOSIS — H52.03: ICD-10-CM

## 2023-02-07 DIAGNOSIS — H25.13: ICD-10-CM

## 2023-02-07 DIAGNOSIS — H52.4: ICD-10-CM

## 2023-02-07 PROCEDURE — 92014 COMPRE OPH EXAM EST PT 1/>: CPT

## 2023-02-07 PROCEDURE — MISCOPTOS MISCELLANEOUS, OPTOS

## 2023-02-07 PROCEDURE — 92310 CONTACT LENS FITTING OU: CPT

## 2023-02-07 PROCEDURE — 92015 DETERMINE REFRACTIVE STATE: CPT

## 2023-02-07 ASSESSMENT — TONOMETRY
OS_IOP_MMHG: 16
OD_IOP_MMHG: 16

## 2023-02-07 ASSESSMENT — KERATOMETRY
OS_K1POWER_DIOPTERS: 42.25
OS_K2POWER_DIOPTERS: 43.25
OD_K2POWER_DIOPTERS: 44.50
OS_AXISANGLE_DEGREES: 159
OS_AXISANGLE2_DEGREES: 69
OD_AXISANGLE_DEGREES: 30
OD_K1POWER_DIOPTERS: 43.00
OD_AXISANGLE2_DEGREES: 120

## 2023-02-07 ASSESSMENT — VISUAL ACUITY
OD_CC: 20/30-1
OS_CC: 20/50

## 2023-02-14 ENCOUNTER — OUTPATIENT (OUTPATIENT)
Dept: OUTPATIENT SERVICES | Facility: HOSPITAL | Age: 73
LOS: 1 days | End: 2023-02-14

## 2023-02-14 ENCOUNTER — APPOINTMENT (OUTPATIENT)
Dept: CT IMAGING | Facility: CLINIC | Age: 73
End: 2023-02-14
Payer: MEDICARE

## 2023-02-14 DIAGNOSIS — Z98.890 OTHER SPECIFIED POSTPROCEDURAL STATES: Chronic | ICD-10-CM

## 2023-02-14 DIAGNOSIS — Z87.42 PERSONAL HISTORY OF OTHER DISEASES OF THE FEMALE GENITAL TRACT: Chronic | ICD-10-CM

## 2023-02-14 PROCEDURE — 71250 CT THORAX DX C-: CPT | Mod: 26,MH

## 2023-02-21 NOTE — H&P ADULT - ATTENDING COMMENTS
reviewed pertinent data , h&p  patient seen and examined overnight     PE findings as above except pt w/ bibasilar mild crackles on exam     1. sepsis w/ acute respiratory failure 2/2 COVID PNA: on remdesivir/ decadron, wean off o2 as tolerated, monitor inflammatory markers.   2. +covid, deferring elective cardiac catherization; consult cardiology while inpatient.        rest of  plan as above Present (15 x15 bpm)

## 2023-03-16 ENCOUNTER — APPOINTMENT (OUTPATIENT)
Dept: RADIOLOGY | Facility: CLINIC | Age: 73
End: 2023-03-16
Payer: MEDICARE

## 2023-03-16 PROCEDURE — 74240 X-RAY XM UPR GI TRC 1CNTRST: CPT

## 2023-08-15 NOTE — DIETITIAN INITIAL EVALUATION ADULT. - REASON FOR ADMISSION
[ - Annual Lung Cancer Screening/Share Decision Making Discussion] : Annual Lung Cancer Screening/Share Decision Making Discussion. (I have advised this patient to have a Low Dose CT (LDCT) scan of the lungs and have discussed the following with the patient in a shared decision making discussion:   Benefits of Detection and Early Treatment: There is adequate evidence that annual screening for lung cancer with LDCT in a population of high-risk persons can prevent a substantial number of lung cancer–related deaths. The magnitude of benefit depends on the individual patient's risk for lung cancer, as those who are at highest risk are most likely to benefit. Screening cannot prevent most lung cancer–related deaths, and does not replace smoking cessation. Harms of Detection and Early Intervention and Treatment: The harms associated with LDCT screening include false-negative and false-positive results, incidental findings, over diagnosis, and radiation exposure. False-positive LDCT results occur in a substantial proportion of screened persons; 95% of all positive results do not lead to a diagnosis of cancer. In a high-quality screening program, further imaging can resolve most false-positive results; however, some patients may require invasive procedures. Radiation harms, including cancer resulting from cumulative exposure to radiation, vary depending on the age at the start of screening; the number of scans received; and the person's exposure to other sources of radiation, particularly other medical imaging.)
AHRF

## 2024-08-22 NOTE — PATIENT PROFILE ADULT - FUNCTIONAL SCREEN CURRENT LEVEL: COMMUNICATION, MLM
"Ricky is a 72 year old who is being evaluated via a billable telephone visit.    {ROOMING STAFF complete during rooming of virtual visit (Optional):712046}  Originating Location (pt. Location): {patient location:999960::\"Home\"}  {PROVIDER LOCATION On-site should be selected for visits conducted from your clinic location or adjoining [facility] hospital, academic office, or other nearby [facility] building. Off-site should be selected for all other provider locations, including home:032846}  Distant Location (provider location):  {virtual location provider:924244}    {PROVIDER CHARTING PREFERENCE:125212}    Subjective   Ricky is a 72 year old, presenting for the following health issues:  No chief complaint on file.  {(!) Visit Details have not yet been documented.  Please enter Visit Details and then use this list to pull in documentation. (Optional):048739}  HPI     {SUPERLIST (Optional):319833}  {additonal problems for provider to add (Optional):868984}    {ROS Picklists (Optional):627435}      Objective           Vitals:  No vitals were obtained today due to virtual visit.    Physical Exam   General: Alert and no distress //Respiratory: No audible wheeze, cough, or shortness of breath // Psychiatric:  Appropriate affect, tone, and pace of words      {Diagnostic Test Results (Optional):329216}      Phone call duration: *** minutes  Signed Electronically by: Melony Hopkins DO  {Email feedback regarding this note to [email or web address]   :983776}  " 0 = understands/communicates without difficulty